# Patient Record
Sex: FEMALE | Race: BLACK OR AFRICAN AMERICAN | NOT HISPANIC OR LATINO | Employment: UNEMPLOYED | URBAN - METROPOLITAN AREA
[De-identification: names, ages, dates, MRNs, and addresses within clinical notes are randomized per-mention and may not be internally consistent; named-entity substitution may affect disease eponyms.]

---

## 2023-01-31 RX ORDER — ACETAMINOPHEN 500 MG
2 TABLET ORAL AS NEEDED
COMMUNITY

## 2023-01-31 RX ORDER — METFORMIN HYDROCHLORIDE 500 MG/1
1 TABLET ORAL
COMMUNITY
Start: 2022-08-04 | End: 2023-10-09 | Stop reason: SDUPTHER

## 2023-01-31 RX ORDER — LOSARTAN POTASSIUM 100 MG/1
1 TABLET ORAL
COMMUNITY
Start: 2022-04-04 | End: 2024-01-09 | Stop reason: SDUPTHER

## 2023-01-31 RX ORDER — TRIAMCINOLONE ACETONIDE 1 MG/G
OINTMENT TOPICAL
COMMUNITY
Start: 2022-04-04 | End: 2024-05-14 | Stop reason: SDUPTHER

## 2023-01-31 RX ORDER — DOXYCYCLINE 100 MG/1
1 CAPSULE ORAL EVERY 12 HOURS
COMMUNITY
End: 2023-03-14 | Stop reason: ALTCHOICE

## 2023-01-31 RX ORDER — ALPRAZOLAM 1 MG/1
1 TABLET ORAL
COMMUNITY
End: 2023-03-14 | Stop reason: ALTCHOICE

## 2023-01-31 RX ORDER — HYDROXYZINE HYDROCHLORIDE 25 MG/1
1 TABLET, FILM COATED ORAL 3 TIMES DAILY PRN
COMMUNITY
End: 2023-08-08 | Stop reason: SDUPTHER

## 2023-01-31 RX ORDER — BUDESONIDE, GLYCOPYRROLATE, AND FORMOTEROL FUMARATE 160; 9; 4.8 UG/1; UG/1; UG/1
2 AEROSOL, METERED RESPIRATORY (INHALATION) 2 TIMES DAILY
COMMUNITY
Start: 2022-05-23 | End: 2023-10-16 | Stop reason: SDUPTHER

## 2023-01-31 RX ORDER — ALBUTEROL SULFATE 90 UG/1
1-2 AEROSOL, METERED RESPIRATORY (INHALATION)
COMMUNITY
End: 2023-10-25 | Stop reason: SDUPTHER

## 2023-01-31 RX ORDER — ROSUVASTATIN CALCIUM 40 MG/1
1 TABLET, COATED ORAL
COMMUNITY
Start: 2022-04-04 | End: 2023-09-27 | Stop reason: SDUPTHER

## 2023-03-14 ENCOUNTER — OFFICE VISIT (OUTPATIENT)
Dept: PRIMARY CARE | Facility: CLINIC | Age: 68
End: 2023-03-14
Payer: MEDICARE

## 2023-03-14 VITALS — HEART RATE: 92 BPM | SYSTOLIC BLOOD PRESSURE: 120 MMHG | DIASTOLIC BLOOD PRESSURE: 70 MMHG | OXYGEN SATURATION: 97 %

## 2023-03-14 DIAGNOSIS — Z78.0 POSTMENOPAUSAL STATE: ICD-10-CM

## 2023-03-14 DIAGNOSIS — Z12.31 ENCOUNTER FOR SCREENING MAMMOGRAM FOR BREAST CANCER: ICD-10-CM

## 2023-03-14 DIAGNOSIS — Z13.220 SCREENING FOR LIPID DISORDERS: ICD-10-CM

## 2023-03-14 DIAGNOSIS — R79.9 ABNORMAL BLOOD CHEMISTRY: ICD-10-CM

## 2023-03-14 DIAGNOSIS — E11.9 CONTROLLED TYPE 2 DIABETES MELLITUS WITHOUT COMPLICATION, WITHOUT LONG-TERM CURRENT USE OF INSULIN (MULTI): Primary | ICD-10-CM

## 2023-03-14 DIAGNOSIS — J44.9 CHRONIC OBSTRUCTIVE PULMONARY DISEASE, UNSPECIFIED COPD TYPE (MULTI): ICD-10-CM

## 2023-03-14 DIAGNOSIS — C49.9 SPINDLE CELL SARCOMA (MULTI): ICD-10-CM

## 2023-03-14 DIAGNOSIS — R53.83 FATIGUE, UNSPECIFIED TYPE: ICD-10-CM

## 2023-03-14 DIAGNOSIS — G47.33 OBSTRUCTIVE SLEEP APNEA: ICD-10-CM

## 2023-03-14 PROBLEM — J30.9 ALLERGIC RHINITIS: Status: ACTIVE | Noted: 2023-03-14

## 2023-03-14 PROBLEM — F17.200 NICOTINE DEPENDENCE: Status: ACTIVE | Noted: 2023-03-14

## 2023-03-14 PROBLEM — M54.9 CHRONIC BACK PAIN: Status: ACTIVE | Noted: 2023-03-14

## 2023-03-14 PROBLEM — E78.5 HYPERLIPIDEMIA: Status: ACTIVE | Noted: 2023-03-14

## 2023-03-14 PROBLEM — H40.9 GLAUCOMA: Status: ACTIVE | Noted: 2023-03-14

## 2023-03-14 PROBLEM — G89.29 CHRONIC BACK PAIN: Status: ACTIVE | Noted: 2023-03-14

## 2023-03-14 PROBLEM — I10 HYPERTENSION: Status: ACTIVE | Noted: 2023-03-14

## 2023-03-14 PROBLEM — J45.909 ASTHMA (HHS-HCC): Status: ACTIVE | Noted: 2023-03-14

## 2023-03-14 PROCEDURE — 3074F SYST BP LT 130 MM HG: CPT | Performed by: PHYSICIAN ASSISTANT

## 2023-03-14 PROCEDURE — 99214 OFFICE O/P EST MOD 30 MIN: CPT | Performed by: PHYSICIAN ASSISTANT

## 2023-03-14 PROCEDURE — 4010F ACE/ARB THERAPY RXD/TAKEN: CPT | Performed by: PHYSICIAN ASSISTANT

## 2023-03-14 PROCEDURE — 1160F RVW MEDS BY RX/DR IN RCRD: CPT | Performed by: PHYSICIAN ASSISTANT

## 2023-03-14 PROCEDURE — 1159F MED LIST DOCD IN RCRD: CPT | Performed by: PHYSICIAN ASSISTANT

## 2023-03-14 PROCEDURE — 3078F DIAST BP <80 MM HG: CPT | Performed by: PHYSICIAN ASSISTANT

## 2023-03-14 ASSESSMENT — PATIENT HEALTH QUESTIONNAIRE - PHQ9
1. LITTLE INTEREST OR PLEASURE IN DOING THINGS: NOT AT ALL
2. FEELING DOWN, DEPRESSED OR HOPELESS: NOT AT ALL
SUM OF ALL RESPONSES TO PHQ9 QUESTIONS 1 AND 2: 0

## 2023-03-14 NOTE — PROGRESS NOTES
Subjective   Patient ID: Cathy Curiel is a 67 y.o. female who presents for Follow-up.    HPI 68 yo female, previous Dr Castillo patient, presents for follow up. Generally doing well, no complaints.    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 Follows with orthopedic surgery.    HTN: Managed with Losartan 100mg. Tolerating well. Does take BP at home. 128-138/76-84. Denies symptoms dizziness, CP, SOB/VIRK, leg swelling.     ERA: Non-compliant with CPAP d/t improper fit and titration. She is planning on scheduling with sleep medicine for further management. Notes having headaches every morning upon waking up, about every other week.Also notes excessive daytime sleepiness.     T2DM: Managed with Metformin 500mg. She is checking BG levels at home, running lowest 76, highest 189. Denies polyuria, polydipsia, fatigue, lightheadedness. Tries to follow a low carb diet. Last A1C 6.2%. No previous urine albumin in EMR, will update. Needs to schedule for diabetic foot and eye exams.     COPD/asthma: Managed with Breztri 2x daily and albuterol PRN. Follows with pulmonology, last seen 1 month ago. Rinses mouth with Breztri use. Denies coughing, wheezing, SOB.    R ear: C/o of high pitched ringing every few months. Does have a hx of allergic rhinitis. No pain. States she has improvement of symptoms with Flonase.    Health Maintenance:  Immunizations:   -Flu: UTD  -COVID 19: received 3 doses   -Shingrix: Recommended, obtain from pharmacy  -Pneumococcal: will obtain [id number] at a later date (nurse schedule)  Mammogram (>41yo): DUE ([facility]) - ordered   PAP - not indicated d/t age  Colonoscopy (45-74yo): UTD, due in 2024  DEXA bone density screening (women >66yo): DUE - ordered   CT cardiac scoring (40-69yo): UTD (last 2/28/2023)  CT chest low dose screening for lung cancer (50-76yo with 20pyh, who currently smoke or who have quit in the last 15yrs): UTD (last 8/4/22) - per  "Pulmonologist     Objective   /70   Pulse 92   Ht (P) 1.626 m (5' 4\")   Wt (P) 86.5 kg (190 lb 12.8 oz)   SpO2 97%   BMI (P) 32.75 kg/m²     Physical Exam  Vitals reviewed.   Constitutional:       General: She is not in acute distress.     Appearance: Normal appearance. She is not ill-appearing.   HENT:      Head: Normocephalic and atraumatic.      Right Ear: Tympanic membrane, ear canal and external ear normal.      Left Ear: Tympanic membrane, ear canal and external ear normal.   Eyes:      General: No scleral icterus.     Extraocular Movements: Extraocular movements intact.      Conjunctiva/sclera: Conjunctivae normal.      Pupils: Pupils are equal, round, and reactive to light.   Cardiovascular:      Rate and Rhythm: Normal rate and regular rhythm.      Heart sounds: Normal heart sounds. No murmur heard.     No friction rub. No gallop.   Pulmonary:      Effort: Pulmonary effort is normal. No respiratory distress.      Breath sounds: Normal breath sounds. No stridor. No wheezing, rhonchi or rales.   Musculoskeletal:      Cervical back: Normal range of motion.      Right lower leg: No edema.      Left lower leg: No edema.   Skin:     General: Skin is warm and dry.   Neurological:      Mental Status: She is alert and oriented to person, place, and time. Mental status is at baseline.      Cranial Nerves: No cranial nerve deficit.      Gait: Gait normal.   Psychiatric:         Mood and Affect: Mood normal.         Behavior: Behavior normal.       Assessment/Plan   Problem List Items Addressed This Visit       Spindle cell sarcoma (CMS/HCC)     S/p wide excision of a left arm 10/10/22. Follows with orthopedic surgery.          Chronic obstructive pulmonary disease, unspecified COPD type (CMS/HCC)     Stable on Breztri and albuterol.  Follows with pulmonology, last seen 1 month ago.         Controlled type 2 diabetes mellitus without complication, without long-term current use of insulin (CMS/HCC) - Primary "     Stable on metformin 500 mg.  Hemoglobin A1c and urine albumin ordered.  Encouraged to schedule for diabetic foot and eye exams.  Continue checking blood glucose levels 1-2 times daily.         Relevant Orders    Albumin, urine, random    Obstructive sleep apnea     Schedule with sleep medicine for PAP titration.  Encouraged weight loss.  Likely the cause of headaches as she is currently not using PAP.          Other Visit Diagnoses       Encounter for screening mammogram for breast cancer        Relevant Orders    BI mammo bilateral screening tomosynthesis    Postmenopausal state        Relevant Orders    XR DEXA bone density    Fatigue, unspecified type        Relevant Orders    Tsh With Reflex To Free T4 If Abnormal    CBC and Auto Differential    Abnormal blood chemistry        Relevant Orders    Comprehensive metabolic panel    Hemoglobin A1c    Screening for lipid disorders        Relevant Orders    Lipid panel

## 2023-03-14 NOTE — ASSESSMENT & PLAN NOTE
Schedule with sleep medicine for PAP titration.  Encouraged weight loss.  Likely the cause of headaches as she is currently not using PAP.

## 2023-03-14 NOTE — ASSESSMENT & PLAN NOTE
Stable on metformin 500 mg.  Hemoglobin A1c and urine albumin ordered.  Encouraged to schedule for diabetic foot and eye exams.  Continue checking blood glucose levels 1-2 times daily.

## 2023-04-25 DIAGNOSIS — E11.9 CONTROLLED TYPE 2 DIABETES MELLITUS WITHOUT COMPLICATION, WITHOUT LONG-TERM CURRENT USE OF INSULIN (MULTI): ICD-10-CM

## 2023-04-25 RX ORDER — INSULIN PUMP SYRINGE, 3 ML
1 EACH MISCELLANEOUS DAILY
Qty: 1 EACH | Refills: 0 | Status: SHIPPED | OUTPATIENT
Start: 2023-04-25

## 2023-04-25 RX ORDER — BLOOD SUGAR DIAGNOSTIC
1 STRIP MISCELLANEOUS DAILY
Qty: 30 STRIP | Refills: 3 | Status: SHIPPED | OUTPATIENT
Start: 2023-04-25 | End: 2023-10-03

## 2023-04-25 RX ORDER — BLOOD-GLUCOSE CONTROL, NORMAL
1 EACH MISCELLANEOUS DAILY
Qty: 100 EACH | Refills: 3 | Status: SHIPPED | OUTPATIENT
Start: 2023-04-25 | End: 2024-05-14 | Stop reason: SDUPTHER

## 2023-04-25 RX ORDER — ISOPROPYL ALCOHOL 70 ML/100ML
1 SWAB TOPICAL DAILY
Qty: 100 EACH | Refills: 2 | Status: SHIPPED | OUTPATIENT
Start: 2023-04-25 | End: 2024-05-14 | Stop reason: SDUPTHER

## 2023-04-25 RX ORDER — INSULIN PUMP SYRINGE, 3 ML
1 EACH MISCELLANEOUS DAILY
COMMUNITY
End: 2023-04-25 | Stop reason: SDUPTHER

## 2023-04-25 RX ORDER — BLOOD SUGAR DIAGNOSTIC
1 STRIP MISCELLANEOUS DAILY
COMMUNITY
End: 2023-04-25 | Stop reason: SDUPTHER

## 2023-04-25 RX ORDER — BLOOD-GLUCOSE CONTROL, NORMAL
EACH MISCELLANEOUS
COMMUNITY
End: 2023-04-25 | Stop reason: SDUPTHER

## 2023-04-25 RX ORDER — ISOPROPYL ALCOHOL 70 ML/100ML
SWAB TOPICAL
COMMUNITY
End: 2023-04-25 | Stop reason: SDUPTHER

## 2023-08-08 ENCOUNTER — OFFICE VISIT (OUTPATIENT)
Dept: PRIMARY CARE | Facility: CLINIC | Age: 68
End: 2023-08-08
Payer: COMMERCIAL

## 2023-08-08 ENCOUNTER — LAB (OUTPATIENT)
Dept: LAB | Facility: LAB | Age: 68
End: 2023-08-08
Payer: COMMERCIAL

## 2023-08-08 VITALS
OXYGEN SATURATION: 96 % | BODY MASS INDEX: 33.29 KG/M2 | DIASTOLIC BLOOD PRESSURE: 84 MMHG | HEIGHT: 64 IN | HEART RATE: 94 BPM | WEIGHT: 195 LBS | SYSTOLIC BLOOD PRESSURE: 138 MMHG

## 2023-08-08 DIAGNOSIS — J06.9 UPPER RESPIRATORY TRACT INFECTION, UNSPECIFIED TYPE: ICD-10-CM

## 2023-08-08 DIAGNOSIS — Z13.89 ENCOUNTER FOR SCREENING FOR OTHER DISORDER: ICD-10-CM

## 2023-08-08 DIAGNOSIS — R53.83 FATIGUE, UNSPECIFIED TYPE: ICD-10-CM

## 2023-08-08 DIAGNOSIS — E78.5 HYPERLIPIDEMIA, UNSPECIFIED HYPERLIPIDEMIA TYPE: ICD-10-CM

## 2023-08-08 DIAGNOSIS — Z00.00 MEDICARE ANNUAL WELLNESS VISIT, SUBSEQUENT: Primary | ICD-10-CM

## 2023-08-08 DIAGNOSIS — F17.210 CIGARETTE NICOTINE DEPENDENCE WITHOUT COMPLICATION: ICD-10-CM

## 2023-08-08 DIAGNOSIS — L29.9 ITCHING: ICD-10-CM

## 2023-08-08 DIAGNOSIS — C49.9 SPINDLE CELL SARCOMA (MULTI): ICD-10-CM

## 2023-08-08 DIAGNOSIS — E11.9 CONTROLLED TYPE 2 DIABETES MELLITUS WITHOUT COMPLICATION, WITHOUT LONG-TERM CURRENT USE OF INSULIN (MULTI): ICD-10-CM

## 2023-08-08 DIAGNOSIS — Z13.220 SCREENING FOR LIPID DISORDERS: ICD-10-CM

## 2023-08-08 DIAGNOSIS — R79.9 ABNORMAL BLOOD CHEMISTRY: ICD-10-CM

## 2023-08-08 DIAGNOSIS — Z00.00 ROUTINE GENERAL MEDICAL EXAMINATION AT HEALTH CARE FACILITY: ICD-10-CM

## 2023-08-08 DIAGNOSIS — J44.9 CHRONIC OBSTRUCTIVE PULMONARY DISEASE, UNSPECIFIED COPD TYPE (MULTI): ICD-10-CM

## 2023-08-08 PROBLEM — B35.4 TINEA CORPORIS: Status: ACTIVE | Noted: 2023-08-08

## 2023-08-08 PROBLEM — K31.819 AVM (ARTERIOVENOUS MALFORMATION) OF DUODENUM, ACQUIRED: Status: ACTIVE | Noted: 2020-01-23

## 2023-08-08 PROBLEM — L30.9 ERYTHEMATOUS ECZEMA: Status: ACTIVE | Noted: 2023-08-08

## 2023-08-08 PROBLEM — M25.562 CHRONIC PAIN OF LEFT KNEE: Status: ACTIVE | Noted: 2018-06-19

## 2023-08-08 PROBLEM — D64.9 ANEMIA: Status: ACTIVE | Noted: 2020-01-23

## 2023-08-08 PROBLEM — M79.89 MASS OF SOFT TISSUE OF UPPER ARM: Status: ACTIVE | Noted: 2023-08-08

## 2023-08-08 PROBLEM — K55.20: Status: ACTIVE | Noted: 2020-10-29

## 2023-08-08 PROBLEM — K59.00 CONSTIPATION: Status: ACTIVE | Noted: 2020-10-29

## 2023-08-08 PROBLEM — J01.90 ACUTE SINUSITIS: Status: ACTIVE | Noted: 2023-08-08

## 2023-08-08 PROBLEM — D50.9 MICROCYTIC ANEMIA: Status: ACTIVE | Noted: 2020-01-08

## 2023-08-08 PROBLEM — M17.12 PRIMARY OSTEOARTHRITIS OF LEFT KNEE: Status: ACTIVE | Noted: 2018-08-22

## 2023-08-08 PROBLEM — R14.0 ABDOMINAL BLOATING: Status: ACTIVE | Noted: 2020-10-29

## 2023-08-08 PROBLEM — R10.10 PAIN OF UPPER ABDOMEN: Status: ACTIVE | Noted: 2020-11-09

## 2023-08-08 PROBLEM — G89.29 CHRONIC PAIN OF LEFT KNEE: Status: ACTIVE | Noted: 2018-06-19

## 2023-08-08 PROBLEM — R22.32 SUBCUTANEOUS MASS OF LEFT UPPER EXTREMITY: Status: ACTIVE | Noted: 2023-08-08

## 2023-08-08 PROBLEM — D50.9 IRON DEFICIENCY ANEMIA: Status: ACTIVE | Noted: 2017-05-10

## 2023-08-08 LAB
ALANINE AMINOTRANSFERASE (SGPT) (U/L) IN SER/PLAS: 15 U/L (ref 7–45)
ALBUMIN (G/DL) IN SER/PLAS: 4.3 G/DL (ref 3.4–5)
ALBUMIN (MG/L) IN URINE: 25.9 MG/L
ALBUMIN/CREATININE (UG/MG) IN URINE: 15.4 UG/MG CRT (ref 0–30)
ALKALINE PHOSPHATASE (U/L) IN SER/PLAS: 63 U/L (ref 33–136)
ANION GAP IN SER/PLAS: 11 MMOL/L (ref 10–20)
ASPARTATE AMINOTRANSFERASE (SGOT) (U/L) IN SER/PLAS: 15 U/L (ref 9–39)
BASOPHILS (10*3/UL) IN BLOOD BY AUTOMATED COUNT: 0.07 X10E9/L (ref 0–0.1)
BASOPHILS/100 LEUKOCYTES IN BLOOD BY AUTOMATED COUNT: 0.6 % (ref 0–2)
BILIRUBIN TOTAL (MG/DL) IN SER/PLAS: 0.3 MG/DL (ref 0–1.2)
CALCIUM (MG/DL) IN SER/PLAS: 10.2 MG/DL (ref 8.6–10.6)
CARBON DIOXIDE, TOTAL (MMOL/L) IN SER/PLAS: 27 MMOL/L (ref 21–32)
CHLORIDE (MMOL/L) IN SER/PLAS: 108 MMOL/L (ref 98–107)
CHOLESTEROL (MG/DL) IN SER/PLAS: 108 MG/DL (ref 0–199)
CHOLESTEROL IN HDL (MG/DL) IN SER/PLAS: 56.3 MG/DL
CHOLESTEROL/HDL RATIO: 1.9
CREATININE (MG/DL) IN SER/PLAS: 1.01 MG/DL (ref 0.5–1.05)
CREATININE (MG/DL) IN URINE: 168 MG/DL (ref 20–320)
EOSINOPHILS (10*3/UL) IN BLOOD BY AUTOMATED COUNT: 0.13 X10E9/L (ref 0–0.7)
EOSINOPHILS/100 LEUKOCYTES IN BLOOD BY AUTOMATED COUNT: 1.2 % (ref 0–6)
ERYTHROCYTE DISTRIBUTION WIDTH (RATIO) BY AUTOMATED COUNT: 15.8 % (ref 11.5–14.5)
ERYTHROCYTE MEAN CORPUSCULAR HEMOGLOBIN CONCENTRATION (G/DL) BY AUTOMATED: 32.5 G/DL (ref 32–36)
ERYTHROCYTE MEAN CORPUSCULAR VOLUME (FL) BY AUTOMATED COUNT: 91 FL (ref 80–100)
ERYTHROCYTES (10*6/UL) IN BLOOD BY AUTOMATED COUNT: 4.68 X10E12/L (ref 4–5.2)
ESTIMATED AVERAGE GLUCOSE FOR HBA1C: 128 MG/DL
GFR FEMALE: 61 ML/MIN/1.73M2
GLUCOSE (MG/DL) IN SER/PLAS: 111 MG/DL (ref 74–99)
HEMATOCRIT (%) IN BLOOD BY AUTOMATED COUNT: 42.5 % (ref 36–46)
HEMOGLOBIN (G/DL) IN BLOOD: 13.8 G/DL (ref 12–16)
HEMOGLOBIN A1C/HEMOGLOBIN TOTAL IN BLOOD: 6.1 %
IMMATURE GRANULOCYTES/100 LEUKOCYTES IN BLOOD BY AUTOMATED COUNT: 0.5 % (ref 0–0.9)
LDL: 35 MG/DL (ref 0–99)
LEUKOCYTES (10*3/UL) IN BLOOD BY AUTOMATED COUNT: 11 X10E9/L (ref 4.4–11.3)
LYMPHOCYTES (10*3/UL) IN BLOOD BY AUTOMATED COUNT: 2.46 X10E9/L (ref 1.2–4.8)
LYMPHOCYTES/100 LEUKOCYTES IN BLOOD BY AUTOMATED COUNT: 22.3 % (ref 13–44)
MONOCYTES (10*3/UL) IN BLOOD BY AUTOMATED COUNT: 0.81 X10E9/L (ref 0.1–1)
MONOCYTES/100 LEUKOCYTES IN BLOOD BY AUTOMATED COUNT: 7.3 % (ref 2–10)
NEUTROPHILS (10*3/UL) IN BLOOD BY AUTOMATED COUNT: 7.51 X10E9/L (ref 1.2–7.7)
NEUTROPHILS/100 LEUKOCYTES IN BLOOD BY AUTOMATED COUNT: 68.1 % (ref 40–80)
NRBC (PER 100 WBCS) BY AUTOMATED COUNT: 0 /100 WBC (ref 0–0)
PLATELETS (10*3/UL) IN BLOOD AUTOMATED COUNT: 325 X10E9/L (ref 150–450)
POTASSIUM (MMOL/L) IN SER/PLAS: 4.4 MMOL/L (ref 3.5–5.3)
PROTEIN TOTAL: 7.3 G/DL (ref 6.4–8.2)
SODIUM (MMOL/L) IN SER/PLAS: 142 MMOL/L (ref 136–145)
THYROTROPIN (MIU/L) IN SER/PLAS BY DETECTION LIMIT <= 0.05 MIU/L: 1.16 MIU/L (ref 0.44–3.98)
TRIGLYCERIDE (MG/DL) IN SER/PLAS: 84 MG/DL (ref 0–149)
UREA NITROGEN (MG/DL) IN SER/PLAS: 19 MG/DL (ref 6–23)
VLDL: 17 MG/DL (ref 0–40)

## 2023-08-08 PROCEDURE — 36415 COLL VENOUS BLD VENIPUNCTURE: CPT

## 2023-08-08 PROCEDURE — 99397 PER PM REEVAL EST PAT 65+ YR: CPT | Performed by: PHYSICIAN ASSISTANT

## 2023-08-08 PROCEDURE — G0444 DEPRESSION SCREEN ANNUAL: HCPCS | Performed by: PHYSICIAN ASSISTANT

## 2023-08-08 PROCEDURE — G0439 PPPS, SUBSEQ VISIT: HCPCS | Performed by: PHYSICIAN ASSISTANT

## 2023-08-08 PROCEDURE — 1170F FXNL STATUS ASSESSED: CPT | Performed by: PHYSICIAN ASSISTANT

## 2023-08-08 PROCEDURE — 80053 COMPREHEN METABOLIC PANEL: CPT

## 2023-08-08 PROCEDURE — 85025 COMPLETE CBC W/AUTO DIFF WBC: CPT

## 2023-08-08 PROCEDURE — 3044F HG A1C LEVEL LT 7.0%: CPT | Performed by: PHYSICIAN ASSISTANT

## 2023-08-08 PROCEDURE — 1125F AMNT PAIN NOTED PAIN PRSNT: CPT | Performed by: PHYSICIAN ASSISTANT

## 2023-08-08 PROCEDURE — 3079F DIAST BP 80-89 MM HG: CPT | Performed by: PHYSICIAN ASSISTANT

## 2023-08-08 PROCEDURE — 99214 OFFICE O/P EST MOD 30 MIN: CPT | Performed by: PHYSICIAN ASSISTANT

## 2023-08-08 PROCEDURE — 99406 BEHAV CHNG SMOKING 3-10 MIN: CPT | Performed by: PHYSICIAN ASSISTANT

## 2023-08-08 PROCEDURE — 1159F MED LIST DOCD IN RCRD: CPT | Performed by: PHYSICIAN ASSISTANT

## 2023-08-08 PROCEDURE — 4010F ACE/ARB THERAPY RXD/TAKEN: CPT | Performed by: PHYSICIAN ASSISTANT

## 2023-08-08 PROCEDURE — 84443 ASSAY THYROID STIM HORMONE: CPT

## 2023-08-08 PROCEDURE — 80061 LIPID PANEL: CPT

## 2023-08-08 PROCEDURE — 82043 UR ALBUMIN QUANTITATIVE: CPT

## 2023-08-08 PROCEDURE — 3075F SYST BP GE 130 - 139MM HG: CPT | Performed by: PHYSICIAN ASSISTANT

## 2023-08-08 PROCEDURE — 82570 ASSAY OF URINE CREATININE: CPT

## 2023-08-08 PROCEDURE — 1124F ACP DISCUSS-NO DSCNMKR DOCD: CPT | Performed by: PHYSICIAN ASSISTANT

## 2023-08-08 PROCEDURE — 1160F RVW MEDS BY RX/DR IN RCRD: CPT | Performed by: PHYSICIAN ASSISTANT

## 2023-08-08 PROCEDURE — 83036 HEMOGLOBIN GLYCOSYLATED A1C: CPT

## 2023-08-08 RX ORDER — DOXYCYCLINE 100 MG/1
100 CAPSULE ORAL 2 TIMES DAILY
Qty: 14 CAPSULE | Refills: 0 | Status: SHIPPED | OUTPATIENT
Start: 2023-08-08 | End: 2023-08-21 | Stop reason: ALTCHOICE

## 2023-08-08 RX ORDER — HYDROXYZINE HYDROCHLORIDE 25 MG/1
25 TABLET, FILM COATED ORAL 3 TIMES DAILY PRN
Qty: 90 TABLET | Refills: 1 | Status: SHIPPED | OUTPATIENT
Start: 2023-08-08 | End: 2023-08-08 | Stop reason: SDUPTHER

## 2023-08-08 RX ORDER — FLUTICASONE PROPIONATE 50 MCG
1 SPRAY, SUSPENSION (ML) NASAL DAILY
Qty: 16 G | Refills: 5 | Status: SHIPPED | OUTPATIENT
Start: 2023-08-08 | End: 2023-08-08 | Stop reason: SDUPTHER

## 2023-08-08 RX ORDER — FLUTICASONE PROPIONATE 50 MCG
1 SPRAY, SUSPENSION (ML) NASAL DAILY
Qty: 16 G | Refills: 5 | Status: SHIPPED | OUTPATIENT
Start: 2023-08-08 | End: 2023-10-25 | Stop reason: SDUPTHER

## 2023-08-08 RX ORDER — HYDROXYZINE HYDROCHLORIDE 25 MG/1
25 TABLET, FILM COATED ORAL 3 TIMES DAILY PRN
Qty: 270 TABLET | Refills: 0 | Status: SHIPPED | OUTPATIENT
Start: 2023-08-08

## 2023-08-08 ASSESSMENT — ACTIVITIES OF DAILY LIVING (ADL)
TAKING_MEDICATION: INDEPENDENT
BATHING: INDEPENDENT
DOING_HOUSEWORK: INDEPENDENT
GROCERY_SHOPPING: INDEPENDENT
DRESSING: INDEPENDENT
MANAGING_FINANCES: INDEPENDENT

## 2023-08-08 ASSESSMENT — ENCOUNTER SYMPTOMS
LOSS OF SENSATION IN FEET: 0
DEPRESSION: 0
OCCASIONAL FEELINGS OF UNSTEADINESS: 0

## 2023-08-08 ASSESSMENT — PATIENT HEALTH QUESTIONNAIRE - PHQ9
SUM OF ALL RESPONSES TO PHQ9 QUESTIONS 1 AND 2: 0
1. LITTLE INTEREST OR PLEASURE IN DOING THINGS: NOT AT ALL
2. FEELING DOWN, DEPRESSED OR HOPELESS: NOT AT ALL

## 2023-08-08 NOTE — PROGRESS NOTES
Subjective   Reason for Visit: Cathy Curiel is an 68 y.o. female here for a Medicare Wellness visit.     Past Medical, Surgical, and Family History reviewed and updated in chart.    Reviewed all medications by prescribing practitioner or clinical pharmacist (such as prescriptions, OTCs, herbal therapies and supplements) and documented in the medical record.    HPI 67yo female presenting for [facility] wellness exam. Overall doing okay. She is c/o:    URI symptoms: for the past 2-3 weeks has had symptoms of chills, joint aches, b/l ear pressure, sinus and eye pressure, cough with yellow sputum, poor sleep d/t coughing, and wheezing when breathing. No fevers, sick contacts, sore throat, sneezing, changes in vision or hearing, tinnitus, chest tightness, SOB/VIRK. She has tried coricidin with mild relief of symptoms.     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 Follows with orthopedic surgery, scheduled at the end of the month. She notes she is still having a lot of pain, not relieved by tylenol. Scheduled for CT and MRI w/ contrast. States she is nervous for MRI d/t previously having a reaction with contrast.      HTN, HLD: Stable on current regimen. Does check BP at home, runs 128-138/76-84. Denies symptoms dizziness, CP, SOB/VIRK, leg swelling. Cannot take  baby ASA d/t prior lower GI bleed.      ERA: Non-compliant with CPAP d/t improper fit and titration. She is planning on scheduling with sleep medicine for further management. Notes having headaches every morning upon waking up, about every other week. Also notes excessive daytime sleepiness.      T2DM: Managed with Metformin 500mg. She is checking BG levels at home, running lowest 77, highest 180, usually 100-110 fasting. Tries to follow a low carb diet. Last A1C 6.2%. No previous urine albumin in EMR, will update. Needs to schedule for diabetic foot and eye exams.      COPD/asthma, nicotine use: Managed with Breztri 2x daily and  "albuterol PRN. Follows with pulmonology, last seen 1 month ago. Rinses mouth with Breztri use. Denies coughing, wheezing, SOB. CT chest low dose screening due. Smoking daily.      Health Maintenance:  Immunizations:   -Flu: deferred to fall 2023  -Shingrix: Recommended, obtain from pharmacy  -Pneumococcal: deferred   Mammogram - scheduled  PAP - not indicated d/t age  Colonoscopy: UTD, due in 2024  DEXA bone density screening: DUE - ordered   CT cardiac scoring UTD (last 2/28/2023)  CT chest low dose screening for lung cancer UTD (last 8/4/22) - annually per Pulmonologist    Last MCR / CPE: 8/8/23 (MCR ADV)    Patient Care Team:  Joanna Rowland PA-C as PCP - General (Family Medicine)  Rowan Galo MD as PCP - [facility] [employer] PCP     Objective   Vitals:  /84   Pulse 94   Ht 1.626 m (5' 4\")   Wt 88.5 kg (195 lb)   SpO2 96%   BMI 33.47 kg/m²       Physical Exam  Vitals reviewed.   Constitutional:       General: She is not in acute distress.     Appearance: Normal appearance.   HENT:      Head: Normocephalic and atraumatic.      Right Ear: Tympanic membrane, ear canal and external ear normal. There is no impacted cerumen.      Left Ear: Tympanic membrane, ear canal and external ear normal. There is no impacted cerumen.      Nose: Nose normal. No congestion or rhinorrhea.      Comments: Mild tenderness with palpation to bilateral maxillary sinuses,      Mouth/Throat:      Mouth: Mucous membranes are moist.      Pharynx: Oropharynx is clear. No oropharyngeal exudate or posterior oropharyngeal erythema.   Eyes:      General: No scleral icterus.        Right eye: No discharge.         Left eye: No discharge.      Extraocular Movements: Extraocular movements intact.      Conjunctiva/sclera: Conjunctivae normal.      Pupils: Pupils are equal, round, and reactive to light.   Cardiovascular:      Rate and Rhythm: Normal rate and regular rhythm.      Heart sounds: Normal heart sounds. No " murmur heard.     No friction rub. No gallop.   Pulmonary:      Effort: Pulmonary effort is normal. No respiratory distress.      Breath sounds: Normal breath sounds. No stridor. No wheezing, rhonchi or rales.   Abdominal:      General: Bowel sounds are normal. There is no distension.      Palpations: Abdomen is soft. There is no mass.      Tenderness: There is no abdominal tenderness. There is no right CVA tenderness or left CVA tenderness.   Musculoskeletal:         General: Normal range of motion.      Cervical back: Normal range of motion and neck supple.      Right lower leg: No edema.      Left lower leg: No edema.   Skin:     General: Skin is warm and dry.      Findings: No rash.   Neurological:      General: No focal deficit present.      Mental Status: She is alert and oriented to person, place, and time. Mental status is at baseline.      Cranial Nerves: No cranial nerve deficit.      Gait: Gait normal.   Psychiatric:         Mood and Affect: Mood normal.         Behavior: Behavior normal.         Assessment/Plan   Problem List Items Addressed This Visit       Spindle cell sarcoma (CMS/HCC)    Current Assessment & Plan     S/p wide excision of a left arm 10/10/22. Follows with orthopedic surgery. Complete CT and MRI as scheduled.          Chronic obstructive pulmonary disease, unspecified COPD type (CMS/HCC)    Current Assessment & Plan     Stable on Breztri and albuterol.  Follows with pulmonology, last seen 1 month ago.         Controlled type 2 diabetes mellitus without complication, without long-term current use of insulin (CMS/HCC)    Current Assessment & Plan     Stable on metformin 500 mg.  Hemoglobin A1c ordered.  Encouraged to schedule for diabetic foot and eye exams. Follow an ADA diet. Continue checking blood glucose levels 1-2 times daily.         Hyperlipidemia    Current Assessment & Plan     Stable. Continue rosuvastatin 40mg. Follow a mediterranean style diet.          Nicotine dependence     Current Assessment & Plan     Encouraged smoking cessation. Due for CT chest low dose screening for lung CA. Follow with pulmonary medicine.          Upper respiratory tract infection    Current Assessment & Plan     Begin doxycycline 100mg 2x daily x 7 days. Take coricidin for symptom control.          Relevant Medications    doxycycline (Vibramycin) 100 mg capsule    fluticasone (Flonase) 50 mcg/actuation nasal spray    Routine general medical examination at health care facility    Current Assessment & Plan     Discussed age appropriate preventive health measures.          Itching    Current Assessment & Plan     Refilled hydroxyzine to use PRN.         Relevant Medications    hydrOXYzine HCL (Atarax) 25 mg tablet     Other Visit Diagnoses       Medicare annual wellness visit, subsequent    -  Primary    Encounter for screening for other disorder                 Follow up in 4 months or sooner as needed

## 2023-08-09 PROBLEM — L29.9 ITCHING: Status: ACTIVE | Noted: 2023-08-09

## 2023-08-09 PROBLEM — J06.9 UPPER RESPIRATORY TRACT INFECTION: Status: ACTIVE | Noted: 2023-08-09

## 2023-08-09 PROBLEM — Z00.00 ROUTINE GENERAL MEDICAL EXAMINATION AT HEALTH CARE FACILITY: Status: ACTIVE | Noted: 2023-08-09

## 2023-08-09 PROBLEM — E11.9 CONTROLLED DIABETES MELLITUS (MULTI): Status: RESOLVED | Noted: 2023-08-08 | Resolved: 2023-08-09

## 2023-08-09 NOTE — ASSESSMENT & PLAN NOTE
Encouraged smoking cessation. Due for CT chest low dose screening for lung CA. Follow with pulmonary medicine.

## 2023-08-09 NOTE — ASSESSMENT & PLAN NOTE
Stable on metformin 500 mg.  Hemoglobin A1c ordered.  Encouraged to schedule for diabetic foot and eye exams. Follow an ADA diet. Continue checking blood glucose levels 1-2 times daily.

## 2023-08-09 NOTE — ASSESSMENT & PLAN NOTE
S/p wide excision of a left arm 10/10/22. Follows with orthopedic surgery. Complete CT and MRI as scheduled.

## 2023-08-11 NOTE — RESULT ENCOUNTER NOTE
Lab results are back.     Hemoglobin A1c shows good control of diabetes. Continue medications unchanged.     Kidney function decreased slightly from prior draw. Continue strict control of diabetes and blood pressure. If function continues to decrease in the future, I recommend starting a new diabetic medication, farxiga or jardiance, that can help protect the kidneys.

## 2023-08-21 ENCOUNTER — OFFICE VISIT (OUTPATIENT)
Dept: PRIMARY CARE | Facility: CLINIC | Age: 68
End: 2023-08-21
Payer: COMMERCIAL

## 2023-08-21 VITALS
HEART RATE: 82 BPM | SYSTOLIC BLOOD PRESSURE: 123 MMHG | DIASTOLIC BLOOD PRESSURE: 75 MMHG | WEIGHT: 195 LBS | BODY MASS INDEX: 33.47 KG/M2 | OXYGEN SATURATION: 95 %

## 2023-08-21 DIAGNOSIS — J30.2 SEASONAL ALLERGIES: ICD-10-CM

## 2023-08-21 DIAGNOSIS — E11.9 CONTROLLED TYPE 2 DIABETES MELLITUS WITHOUT COMPLICATION, WITHOUT LONG-TERM CURRENT USE OF INSULIN (MULTI): Primary | ICD-10-CM

## 2023-08-21 DIAGNOSIS — M51.37 DEGENERATION OF INTERVERTEBRAL DISC OF LUMBOSACRAL REGION: ICD-10-CM

## 2023-08-21 PROBLEM — L30.9 ECZEMA: Status: RESOLVED | Noted: 2017-05-09 | Resolved: 2023-08-21

## 2023-08-21 PROCEDURE — 3078F DIAST BP <80 MM HG: CPT | Performed by: PHYSICIAN ASSISTANT

## 2023-08-21 PROCEDURE — 1125F AMNT PAIN NOTED PAIN PRSNT: CPT | Performed by: PHYSICIAN ASSISTANT

## 2023-08-21 PROCEDURE — 4010F ACE/ARB THERAPY RXD/TAKEN: CPT | Performed by: PHYSICIAN ASSISTANT

## 2023-08-21 PROCEDURE — 3074F SYST BP LT 130 MM HG: CPT | Performed by: PHYSICIAN ASSISTANT

## 2023-08-21 PROCEDURE — 99214 OFFICE O/P EST MOD 30 MIN: CPT | Performed by: PHYSICIAN ASSISTANT

## 2023-08-21 PROCEDURE — 1160F RVW MEDS BY RX/DR IN RCRD: CPT | Performed by: PHYSICIAN ASSISTANT

## 2023-08-21 PROCEDURE — 3044F HG A1C LEVEL LT 7.0%: CPT | Performed by: PHYSICIAN ASSISTANT

## 2023-08-21 PROCEDURE — 1159F MED LIST DOCD IN RCRD: CPT | Performed by: PHYSICIAN ASSISTANT

## 2023-08-21 RX ORDER — AZELASTINE 1 MG/ML
1 SPRAY, METERED NASAL 2 TIMES DAILY
Qty: 30 ML | Refills: 3 | Status: SHIPPED | OUTPATIENT
Start: 2023-08-21 | End: 2023-10-25 | Stop reason: ALTCHOICE

## 2023-08-21 NOTE — PROGRESS NOTES
Subjective   Cathy Curiel is a 68 y.o. female who presents for lower back pain, lightheaded, wants off metformin .  HPI Cathy Curiel is a 68 y.o. female presenting for a follow up. Generally doing well. Currently c/o:    Low back pain: chronic. S/p R L3-L5 medial branch nerve radiofrequency ablation under fluoroscopy 11/30/21 and bilateral L3 and L4 medical branch nerve blocks and bilateral L5 posterior rami nerve block under fluroscropy 5/26/21 [facility]. She is requesting Butrans pain patches from me, advised her that we do not prescribe controlled substances. Previously also tried cyclobenzaprine and robaxin. Not currently following with pain management. Requesting handicap placard for difficulty ambulating d/t back pain.     Lightheaded after taking all medications, especially metformin. States she has GI upset with Metformin, has done research on it, and no longer will take it. She is checking BG levels at home, running lowest 77, highest 180, usually 100-110 fasting. Tries to follow a low carb diet. Last A1C 6.1% 8/8/23. Recall she recently was treated for URI, s/p ATBs. Drinks plenty of water. Eats a well rounded diet, avoids fried foods, only bakes foods.    HTN, HLD: Stable on current regimen. Does check BP at home, runs 128-138/76-84.  Denies symptoms dizziness, CP, SOB/VIRK, leg swelling. Cannot take baby ASA d/t prior lower GI bleed.      12 point ROS reviewed and negative other than as stated in HPI    /75   Pulse 82   Wt 88.5 kg (195 lb)   SpO2 95%   BMI 33.47 kg/m²   Objective   Physical Exam  Vitals reviewed.   Constitutional:       General: She is not in acute distress.     Appearance: Normal appearance. She is not ill-appearing.   HENT:      Head: Normocephalic and atraumatic.   Eyes:      General: No scleral icterus.     Extraocular Movements: Extraocular movements intact.      Conjunctiva/sclera: Conjunctivae normal.      Pupils: Pupils are equal, round,  and reactive to light.   Cardiovascular:      Rate and Rhythm: Normal rate and regular rhythm.      Heart sounds: Normal heart sounds. No murmur heard.     No friction rub. No gallop.   Pulmonary:      Effort: Pulmonary effort is normal. No respiratory distress.      Breath sounds: Normal breath sounds. No stridor. No wheezing, rhonchi or rales.   Musculoskeletal:      Cervical back: Normal range of motion.      Right lower leg: No edema.      Left lower leg: No edema.   Skin:     General: Skin is warm and dry.   Neurological:      Mental Status: She is alert and oriented to person, place, and time. Mental status is at baseline.      Cranial Nerves: No cranial nerve deficit.      Gait: Gait normal.   Psychiatric:         Mood and Affect: Mood normal.         Behavior: Behavior normal.       Assessment/Plan   Problem List Items Addressed This Visit       Controlled type 2 diabetes mellitus without complication, without long-term current use of insulin (CMS/[facility]) - Primary     Due to lightheadedness, discontinue metformin. Begin Rybelsus, pending insurance coverage. Will titrate up dose as tolerated. Discussed side effects. Continue checking BG levels closely.          Relevant Medications    semaglutide (Rybelsus) 3 mg tablet    Seasonal allergies     Trial azelastine nasal spray, prescription sent to the pharmacy         Relevant Medications    azelastine (Astelin) 137 mcg (0.1 %) nasal spray    Degeneration of intervertebral disc of lumbosacral region     Chronic. Take tylenol arthritis as needed for pain. Apply heating pad in 20-minute intervals and lidocaine patches in 12-hour intervals.  Can refer to PT or pain management if interested.  Disability placard provided         Relevant Orders    Disability Placard        Follow-up as scheduled or sooner as needed

## 2023-08-26 NOTE — ASSESSMENT & PLAN NOTE
Chronic. Take tylenol arthritis as needed for pain. Apply heating pad in 20-minute intervals and lidocaine patches in 12-hour intervals.  Can refer to PT or pain management if interested.  Disability placard provided

## 2023-09-19 ENCOUNTER — TELEPHONE (OUTPATIENT)
Dept: PRIMARY CARE | Facility: CLINIC | Age: 68
End: 2023-09-19
Payer: COMMERCIAL

## 2023-09-20 ENCOUNTER — APPOINTMENT (OUTPATIENT)
Dept: PRIMARY CARE | Facility: CLINIC | Age: 68
End: 2023-09-20
Payer: COMMERCIAL

## 2023-09-21 ENCOUNTER — APPOINTMENT (OUTPATIENT)
Dept: PRIMARY CARE | Facility: CLINIC | Age: 68
End: 2023-09-21
Payer: COMMERCIAL

## 2023-09-27 DIAGNOSIS — E78.5 HYPERLIPIDEMIA, UNSPECIFIED HYPERLIPIDEMIA TYPE: Primary | ICD-10-CM

## 2023-09-27 RX ORDER — ROSUVASTATIN CALCIUM 40 MG/1
40 TABLET, COATED ORAL
Qty: 90 TABLET | Refills: 1 | Status: SHIPPED | OUTPATIENT
Start: 2023-09-27 | End: 2024-01-09 | Stop reason: SDUPTHER

## 2023-09-29 DIAGNOSIS — E11.9 CONTROLLED TYPE 2 DIABETES MELLITUS WITHOUT COMPLICATION, WITHOUT LONG-TERM CURRENT USE OF INSULIN (MULTI): ICD-10-CM

## 2023-10-03 RX ORDER — BLOOD SUGAR DIAGNOSTIC
STRIP MISCELLANEOUS
Qty: 100 STRIP | Refills: 3 | Status: SHIPPED | OUTPATIENT
Start: 2023-10-03

## 2023-10-09 DIAGNOSIS — E11.9 CONTROLLED TYPE 2 DIABETES MELLITUS WITHOUT COMPLICATION, WITHOUT LONG-TERM CURRENT USE OF INSULIN (MULTI): Primary | ICD-10-CM

## 2023-10-09 RX ORDER — METFORMIN HYDROCHLORIDE 500 MG/1
500 TABLET ORAL
Qty: 90 TABLET | Refills: 1 | Status: SHIPPED | OUTPATIENT
Start: 2023-10-09 | End: 2024-01-09 | Stop reason: SDUPTHER

## 2023-10-10 ENCOUNTER — OFFICE VISIT (OUTPATIENT)
Dept: ORTHOPEDIC SURGERY | Facility: HOSPITAL | Age: 68
End: 2023-10-10
Payer: COMMERCIAL

## 2023-10-10 DIAGNOSIS — C49.9 SPINDLE CELL SARCOMA (MULTI): Primary | ICD-10-CM

## 2023-10-10 PROCEDURE — 1125F AMNT PAIN NOTED PAIN PRSNT: CPT | Performed by: STUDENT IN AN ORGANIZED HEALTH CARE EDUCATION/TRAINING PROGRAM

## 2023-10-10 PROCEDURE — 1159F MED LIST DOCD IN RCRD: CPT | Performed by: STUDENT IN AN ORGANIZED HEALTH CARE EDUCATION/TRAINING PROGRAM

## 2023-10-10 PROCEDURE — 1160F RVW MEDS BY RX/DR IN RCRD: CPT | Performed by: STUDENT IN AN ORGANIZED HEALTH CARE EDUCATION/TRAINING PROGRAM

## 2023-10-10 PROCEDURE — 3044F HG A1C LEVEL LT 7.0%: CPT | Performed by: STUDENT IN AN ORGANIZED HEALTH CARE EDUCATION/TRAINING PROGRAM

## 2023-10-10 PROCEDURE — 99213 OFFICE O/P EST LOW 20 MIN: CPT | Performed by: STUDENT IN AN ORGANIZED HEALTH CARE EDUCATION/TRAINING PROGRAM

## 2023-10-10 PROCEDURE — 4010F ACE/ARB THERAPY RXD/TAKEN: CPT | Performed by: STUDENT IN AN ORGANIZED HEALTH CARE EDUCATION/TRAINING PROGRAM

## 2023-10-10 ASSESSMENT — PAIN - FUNCTIONAL ASSESSMENT: PAIN_FUNCTIONAL_ASSESSMENT: 0-10

## 2023-10-10 ASSESSMENT — PAIN SCALES - GENERAL: PAINLEVEL_OUTOF10: 8

## 2023-10-10 NOTE — PROGRESS NOTES
Orthopaedic Surgery  New Patient Clinic Note    Cathy Curiel [id number] October 10, 2023    Reason for Visit: Follow-up for left arm spindle cell sarcoma    HPI: Patient is a 68-year-old female here for follow-up status post wide resection of a left arm distal sarcoma.  She is here for surveillance scans of her left arm as well as CT scan of her chest.  She denies any new lumps or bumps.  Denies any significant shortness of breath.  No significant numbness or tingling.  No new issues to report.    ROS:    Review of Systems  A complete review of systems was conducted, pertinent only to the HPI noted above.    Constitutional: None    Eyes: No additions to above history    Ears, Nose, Throat: No additions to above history    Cardiovascular: No additions to above history    Respiratory: No additions to above history    GI: No additions to above history    : No additions to above history    Skin/Neuro: No additions to above history    Endocrine/Heme/Lymph: No additions to above history    Immunologic: No additions to above history    Psychiatric: No additions to above history    Musculoskeletal: see above      PMH:   Past Medical History:   Diagnosis Date    Other intervertebral disc displacement, lumbar region     Lumbar disc herniation   No history of DVT.     PSH:    Past Surgical History:   Procedure Laterality Date    CT GUIDED PERCUTANEOUS BIOPSY BONE DEEP  8/29/2022    CT GUIDED PERCUTANEOUS BIOPSY BONE DEEP 8/29/2022 [facility]    OTHER SURGICAL HISTORY  04/04/2022    Shoulder arthroscopy        SHx: Smoking.     Meds:   Current Outpatient Medications on File Prior to Visit   Medication Sig Dispense Refill    acetaminophen (Tylenol) 500 mg tablet Take 2 tablets (1,000 mg) by mouth if needed.      albuterol 90 mcg/actuation inhaler Inhale 1-2 puffs. Every 4 to 6 hours as needed      alcohol swabs (Alcohol Prep Pads) pads, medicated Apply 1 Pad topically once daily. 100 each 2    azelastine (Astelin) 137  mcg (0.1 %) nasal spray Administer 1 spray into each nostril 2 times a day. Use in each nostril as directed 30 mL 3    blood sugar diagnostic (OneTouch Ultra Test) strip TEST ONCE DAILY 100 strip 3    budesonide-glycopyr-formoterol (Breztri Aerosphere) 160-9-4.8 mcg/actuation HFA aerosol inhaler Inhale 2 puffs 2 times a day.      fluticasone (Flonase) 50 mcg/actuation nasal spray Administer 1 spray into each nostril once daily. Shake gently. Before first use, prime pump. After use, clean tip and replace cap. 16 g 5    FreeStyle glucose monitoring (OneTouch Ultra2 Meter) kit 1 each once daily. 1 each 0    hydrOXYzine HCL (Atarax) 25 mg tablet Take 1 tablet (25 mg) by mouth 3 times a day as needed for itching. 270 tablet 0    lancets (mapp2link Delica Safety Lancet) 30 gauge misc 1 Lancet once daily. 100 each 3    losartan (Cozaar) 100 mg tablet Take 1 tablet (100 mg) by mouth once every day.      metFORMIN (Glucophage) 500 mg tablet Take 1 tablet (500 mg) by mouth once every day. 90 tablet 1    mv,rex,min/iron/folic acid/lut (COMPLETE MULTI ORAL) Take 1 capsule by mouth 1 (one) time each day.      rosuvastatin (Crestor) 40 mg tablet Take 1 tablet (40 mg) by mouth once every day. 90 tablet 1    triamcinolone (Kenalog) 0.1 % ointment Apply gently to the affected area three times daily to four times daily      [DISCONTINUED] metFORMIN (Glucophage) 500 mg tablet Take 1 tablet (500 mg) by mouth once every day.       No current facility-administered medications on file prior to visit.       PHYSICAL EXAM    GEN: AaOx4, NAD,   HEENT: normocephalic atraumatic, EOMI, MMM, pupils equal and round  PSYCH: appropriate mood and affect  RESP: nonlabored breathing on   CARDIAC: Extremities WWP, RRR to peripheral palpation  NEURO: CN 2-12 grossly intact  SKIN: Incision clean, dry and intact    Physical exam of the left upper extremity shows no recurrences or masses.  He has full elbow and shoulder range of motion.  Sensation is intact  light touch in all distributions.  He has palpable pulses and brisk cap refill distally.  AIN, PIN, ulnar nerves are intact.    Imaging:    MRI of the left upper extremity with without contrast was obtained and individually reviewed by myself.  This shows no evidence of nodular enhancing areas to suggest local recurrence.  CT scan of the chest shows no obvious areas of metastatic disease but does redemonstrate stable pulmonary nodules.      Assessment:    68-year-old female status post wide resection of a left arm spindle cell sarcoma    Plan:    So far she has been stable surveillance scans.  I plan see her back in 6 months with repeat CT scan of her chest without contrast as well as MRI of her left arm with and without contrast.  She will follow-up with me in 6 months after the scans are obtained.

## 2023-10-11 ENCOUNTER — CLINICAL SUPPORT (OUTPATIENT)
Dept: PRIMARY CARE | Facility: CLINIC | Age: 68
End: 2023-10-11
Payer: COMMERCIAL

## 2023-10-11 DIAGNOSIS — Z00.00 HEALTHCARE MAINTENANCE: Primary | ICD-10-CM

## 2023-10-11 PROCEDURE — G0008 ADMIN INFLUENZA VIRUS VAC: HCPCS | Performed by: PHYSICIAN ASSISTANT

## 2023-10-11 PROCEDURE — 90662 IIV NO PRSV INCREASED AG IM: CPT | Performed by: PHYSICIAN ASSISTANT

## 2023-10-16 DIAGNOSIS — J45.52 SEVERE PERSISTENT ASTHMA WITH STATUS ASTHMATICUS (MULTI): Primary | ICD-10-CM

## 2023-10-20 ENCOUNTER — APPOINTMENT (OUTPATIENT)
Dept: PULMONOLOGY | Facility: HOSPITAL | Age: 68
End: 2023-10-20
Payer: COMMERCIAL

## 2023-10-23 RX ORDER — BUDESONIDE, GLYCOPYRROLATE, AND FORMOTEROL FUMARATE 160; 9; 4.8 UG/1; UG/1; UG/1
2 AEROSOL, METERED RESPIRATORY (INHALATION) 2 TIMES DAILY
Qty: 24 G | Refills: 11 | OUTPATIENT
Start: 2023-10-23 | End: 2023-10-25 | Stop reason: SDUPTHER

## 2023-10-25 ENCOUNTER — TELEPHONE (OUTPATIENT)
Dept: PULMONOLOGY | Facility: HOSPITAL | Age: 68
End: 2023-10-25
Payer: COMMERCIAL

## 2023-10-25 ENCOUNTER — OFFICE VISIT (OUTPATIENT)
Dept: PULMONOLOGY | Facility: HOSPITAL | Age: 68
End: 2023-10-25
Payer: COMMERCIAL

## 2023-10-25 VITALS
TEMPERATURE: 97 F | RESPIRATION RATE: 20 BRPM | BODY MASS INDEX: 33.53 KG/M2 | HEART RATE: 79 BPM | DIASTOLIC BLOOD PRESSURE: 70 MMHG | SYSTOLIC BLOOD PRESSURE: 142 MMHG | WEIGHT: 195.33 LBS | OXYGEN SATURATION: 100 %

## 2023-10-25 DIAGNOSIS — L30.9 ECZEMA, UNSPECIFIED TYPE: ICD-10-CM

## 2023-10-25 DIAGNOSIS — J45.52 SEVERE PERSISTENT ASTHMA WITH STATUS ASTHMATICUS (MULTI): ICD-10-CM

## 2023-10-25 DIAGNOSIS — J44.9 CHRONIC OBSTRUCTIVE PULMONARY DISEASE, UNSPECIFIED COPD TYPE (MULTI): Primary | ICD-10-CM

## 2023-10-25 DIAGNOSIS — J06.9 UPPER RESPIRATORY TRACT INFECTION, UNSPECIFIED TYPE: ICD-10-CM

## 2023-10-25 PROCEDURE — 99215 OFFICE O/P EST HI 40 MIN: CPT | Performed by: NURSE PRACTITIONER

## 2023-10-25 PROCEDURE — 1125F AMNT PAIN NOTED PAIN PRSNT: CPT | Performed by: NURSE PRACTITIONER

## 2023-10-25 PROCEDURE — 1159F MED LIST DOCD IN RCRD: CPT | Performed by: NURSE PRACTITIONER

## 2023-10-25 PROCEDURE — 1160F RVW MEDS BY RX/DR IN RCRD: CPT | Performed by: NURSE PRACTITIONER

## 2023-10-25 PROCEDURE — 4010F ACE/ARB THERAPY RXD/TAKEN: CPT | Performed by: NURSE PRACTITIONER

## 2023-10-25 PROCEDURE — 3044F HG A1C LEVEL LT 7.0%: CPT | Performed by: NURSE PRACTITIONER

## 2023-10-25 PROCEDURE — 3078F DIAST BP <80 MM HG: CPT | Performed by: NURSE PRACTITIONER

## 2023-10-25 PROCEDURE — 3077F SYST BP >= 140 MM HG: CPT | Performed by: NURSE PRACTITIONER

## 2023-10-25 RX ORDER — BUDESONIDE, GLYCOPYRROLATE, AND FORMOTEROL FUMARATE 160; 9; 4.8 UG/1; UG/1; UG/1
2 AEROSOL, METERED RESPIRATORY (INHALATION) 2 TIMES DAILY
Qty: 72 G | Refills: 3 | Status: SHIPPED | OUTPATIENT
Start: 2023-10-25 | End: 2024-10-24

## 2023-10-25 RX ORDER — ALBUTEROL SULFATE 90 UG/1
1-2 AEROSOL, METERED RESPIRATORY (INHALATION) EVERY 6 HOURS PRN
Qty: 54 G | Refills: 3 | Status: SHIPPED | OUTPATIENT
Start: 2023-10-25 | End: 2024-04-10 | Stop reason: SDUPTHER

## 2023-10-25 RX ORDER — FLUTICASONE PROPIONATE 50 MCG
1 SPRAY, SUSPENSION (ML) NASAL DAILY
Qty: 16 G | Refills: 5 | Status: SHIPPED | OUTPATIENT
Start: 2023-10-25 | End: 2024-04-10 | Stop reason: SDUPTHER

## 2023-10-25 ASSESSMENT — ENCOUNTER SYMPTOMS
SINUS PRESSURE: 0
VOICE CHANGE: 0
AGITATION: 0
HEADACHES: 0
ABDOMINAL PAIN: 0
RHINORRHEA: 0
VOMITING: 0
EYE PAIN: 0
NERVOUS/ANXIOUS: 0
FATIGUE: 0
JOINT SWELLING: 0
DIZZINESS: 0
BACK PAIN: 0
WEAKNESS: 0
ARTHRALGIAS: 0
NUMBNESS: 0
FEVER: 0
PALPITATIONS: 0
NAUSEA: 0
MYALGIAS: 0
DIARRHEA: 0

## 2023-10-25 ASSESSMENT — PAIN SCALES - GENERAL: PAINLEVEL: 7

## 2023-10-25 NOTE — PATIENT INSTRUCTIONS
1. COPD/VIRK: Patient states she was diagnosed with both COPD and Asthma about 8 years ago. Currently establishing care with . Hx of TB with hospitalization as a child. PFTs with mild obstruction. Echo with normal EF - impaired relaxation.   - continue Breztri; 2 puffs twice a day -> wash out mouth after use. Call the office if insurance company will not cover.  - continue albuterol HFA inhaler 2 puffs or albuterol nebulizer treatment every 4-6 hours as needed for shortness of breath   - discussed pulmonary rehab down the line - plan to increase activity at home   - discussed increasing activity as tolerated   - will get jakob pre/post and FENO   - will get RAST panel -- blood test      2. Nicotine Dependence; current active smoker. Shared decisionmaking discussion completed with patient for lung cancer screening. She has tried pills (chantix)/patches and it did not work. Resection of sarcoma, currently getting surveillance screening with Ortho. Defer LDS CT at this time.  - >5 min smoking cessation spent with patient - brochure given in clinic today  - she is going to call the smoking hotline to get some free nicotine gum      3. ERA; diagnosed with sleep apnea last year. Has a CPAP device but unsure on how to use it.   - continue to follow with sleep      4. Allergic rhinitis: she has runny nose and post nasal drip   - continue fluticasone (flonase) 1 spray each nostril twice daily   - can get OTC nasal saline -- this can help keep your nasal passages moist     5. Eczema: bothersome symptoms. Will see if more of an asthma like picture could qualify for biologic   - referral to dermatology  Thank you for visiting the Pulmonary clinic today!   Return to clinic 2-3 months with PFTs or sooner if needed   Jessica Hale CNP  My office number is [phone]  Claudine is my  and Tiffanie is my nurse.   Radiology scheduling [phone]   Appointment scheduling [phone]

## 2023-10-25 NOTE — TELEPHONE ENCOUNTER
Left voicemail informing the patient that she needs to call [facility] at [phone] and request a refill.

## 2023-10-25 NOTE — PROGRESS NOTES
Patient: Cathy Curiel    [id number]  : 1955 -- AGE 68 y.o.    Provider: PAYAM Covington-CNP     Location [facility]   Service Date: 10/25/2023              [facility]  Follow up visit note      HISTORY OF PRESENT ILLNESS       HISTORY OF PRESENT ILLNESS   Mrs. Curiel is a 68 year old woman, current smoker (~0.5 PPD for 49 years, ~25 total pack years.) here for follow up for COPD & Asthma last seen in clinic on 23.     She has been using her Breztri twice daily- and albuterol 5-6 x per day. She has been using her nebulizer 1-2 x per month.  She is currently not working and has been not as active. She has been not moving around as much because she needs to use her rescue more. She had her flu shot a week or so ago - feels like she is fighting a cold. She has some chest soreness form coughing so much. She quit smoking for 2 weeks. She states her cough is dry. She has wheezing at night when she lays down at night and with exertion. She has VIRK with going up/ down the stairs. She denies any SOB at rest. She denies sharp chest pains -- just sore from coughing.     She was started on loratadine as needed for a few weeks and did not notice an improvement. She has throbbing in her ears  - told it was clouded over from too many ear infections. She has been using ear drops intermittently that has been helpful. She states what she hears in the right ear is normal, but muffled.  She has itching in the ear canal. She has been using flonase daily at bedtime.  She has eczema - changed soaps, using cold water, and using an ointment. She states none of this has helped. She has been taking hydroxzyzine as needed, but she does not like them because they make her drowsy.  She has been having GERD with red sauce  - takes spoon of mustard and tums.    She was seen by Dr. Arboleda and has a CPAP. She states she needs a cord for her CPAP machine -- states she talked to  the sleep nurse and they ordered it.   Smoking       2/2/23: Since last visit she has been using Breztri daily, albuterol 3 or 4 times with activities such as cleaning or grocery shopping, walking a distance. She is still smoking almost a pack a day. She coughs up yellow mucus first thing in the mornings. Wheezes and has chest tightness, albuterol helps this and the VIRK. She does have some nasal congestion/post nasal drip - she never got the loratadine we talked abou tlast visit. She sometimes wakes up at night and feels like she is choking, drinks some water a goes back to sleep. States she has not been sleeping well lately. She follows sleep and got a new CPAP machine that is not working for her. She is following up to get her settings changed. Has occasional GERD takes Tums or spoon full of mustard. No ED visits for respiratory issues. Up to date on vaccines - just needs next Covid booster. 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 She is still smoking, but is going to call the hotline today for assistance and is motivated to quit.      8/4/22: Since last visit she feels her breathing is about the same. She has VIRK with going up stairs and walking too far on level ground. She has been trying to walk to lose weight. She has had some chest discomfort related to weight and gas. She has been using her breztri twice daily. She uses her rescue 2-3 x per day. She has a nebulizer machine, but only needs treatments once every 2-3 weeks. She has been working on cutting down on smoking, but is still smoking a pack a day. She has a productive cough with clear sputum - more in the morning. She does have some runny nose and throat clearing. She notices some wheezing every once in a while - more so when she smokes to much. She denies any SOB at rest, or CP. She denies any issues with GERD, but has been having gas. She states this has been worse since she has gained weight.  "She feels it around her back under her breasts. She has been doing mustard and water or gas relief - she feels this helps. She feels the mustard helps with both gas as well as Galo horses. She had been following with GI at [facility] whom she had a disagreement with -- which is why she transferred care to . We discussed related to the significance of her gas pains it could be beneficial to establish with GI here. She has a CPAP machine and has had issues with setting it. She does notice some ankle edema if she is standing barbecuing for an extended period of time.   CAT today 19   ACT today 10      5/23/22: Patient presents to pulmonary clinic today for initially evaluation of COPD & Asthma. Was seen by her PCP on 4/4/22; of which was noted that PCP gave patient Breztri to try; and patient instructed to hold her Advair while trialing this medication. Was able to use the Breztri for 14 days before it ran out - felt like she was breathing a little better. Was diagnosed with Asthma and COPD 8 years ago per patient. Started smoking at age 17 - still smoking today. Currently smoking about 1ppd for the past few years, but on average she states about 6-8 cigarettes/day over time. She was able to quit smoking for about 1.5 years, but started back again. Has tried patches, Chantix (bad nightmares). Upon walking up her 12 stairs at her home - she gets very short of breath. Is able to walk on a flat surface without difficulty for about 15 minutes. Able to shower and get dressed w/o difficulty. Stopped working about 2 years ago - ever since then, breathing has gotten progressively worse. Using her Albuterol HFA typically twice a day everyday. If she is being more active - requires more.  -- She also denies orthopnea. Claims post nasal drip - generic OTC \"allergy relief\" medicine. Uses PRN - has not needed it this year yet. Denies lower leg edema. Her weight has been mostly stable. She also relates chronic cough. Cough is " productive. Sputum color is clear; no hemoptysis. Claims wheezing; every once in awhile. No night cough. No fever, shivering chills, +night sweats (claims hormonal). Very rare/occasional symptoms of heartburn - watches what she eats. She denies chest pain.  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 Never on oxygen. She has never been to pulmonary rehab.       ALLERGIES AND MEDICATIONS     ALLERGIES  Allergies   Allergen Reactions    Azithromycin Hives, Shortness of breath and Swelling     feet swell and blister    Meloxicam Hives and Unknown    Etodolac Other     HA    Penicillin Swelling    Penicillins Unknown    Benzonatate Hives, Itching and Rash    Iodinated Contrast Media Rash    Latex Rash       MEDICATIONS  Current Outpatient Medications   Medication Sig Dispense Refill    acetaminophen (Tylenol) 500 mg tablet Take 2 tablets (1,000 mg) by mouth if needed.      albuterol 90 mcg/actuation inhaler Inhale 1-2 puffs. Every 4 to 6 hours as needed      alcohol swabs (Alcohol Prep Pads) pads, medicated Apply 1 Pad topically once daily. 100 each 2    azelastine (Astelin) 137 mcg (0.1 %) nasal spray Administer 1 spray into each nostril 2 times a day. Use in each nostril as directed 30 mL 3    blood sugar diagnostic (OneTouch Ultra Test) strip TEST ONCE DAILY 100 strip 3    budesonide-glycopyr-formoterol (Breztri Aerosphere) 160-9-4.8 mcg/actuation HFA aerosol inhaler Inhale 2 puffs 2 times a day. 24 g 11    fluticasone (Flonase) 50 mcg/actuation nasal spray Administer 1 spray into each nostril once daily. Shake gently. Before first use, prime pump. After use, clean tip and replace cap. 16 g 5    FreeStyle glucose monitoring (OneTouch Ultra2 Meter) kit 1 each once daily. 1 each 0    hydrOXYzine HCL (Atarax) 25 mg tablet Take 1 tablet (25 mg) by mouth 3 times a day as needed for itching. 270 tablet 0    lancets  ([facility] Safety Lancet) 30 gauge misc 1 Lancet once daily. 100 each 3    losartan (Cozaar) 100 mg tablet Take 1 tablet (100 mg) by mouth once every day.      metFORMIN (Glucophage) 500 mg tablet Take 1 tablet (500 mg) by mouth once every day. 90 tablet 1    mv,rex,min/iron/folic acid/lut (COMPLETE MULTI ORAL) Take 1 capsule by mouth 1 (one) time each day.      rosuvastatin (Crestor) 40 mg tablet Take 1 tablet (40 mg) by mouth once every day. 90 tablet 1    triamcinolone (Kenalog) 0.1 % ointment Apply gently to the affected area three times daily to four times daily       No current facility-administered medications for this visit.         PAST HISTORY     PAST MEDICAL HISTORY  Current/Past Inhalers & Nebulized Medications:   - Advair  - Albuterol HFA PRN  - Albuterol Nebulizer PRN  - Spiriva Handihaler  - Symbicort (past)     Pulmonary/Significant Hospitalization History:  - 2 year hospitalization age 6 for TB     Sleep History:  - Has a CPAP machine; got it last year. Unsure on how to use it. Only tried it 2-3 times. Has been referred to Sleep by PCP.     Significant Comorbidities:  - COPD  - HTN  - ERA  - HLD  - DM2  - Hx of TB (Age 6)    PAST SURGICAL HISTORY  Past Surgical History:   Procedure Laterality Date    CT GUIDED PERCUTANEOUS BIOPSY BONE DEEP  8/29/2022    CT GUIDED PERCUTANEOUS BIOPSY BONE DEEP 8/29/2022 [facility] [place] [facility]    OTHER SURGICAL HISTORY  04/04/2022    Shoulder arthroscopy       IMMUNIZATION HISTORY  Immunization History   Administered Date(s) Administered    Flu vaccine, quadrivalent, high-dose, preservative free, age 65y+ (FLUZONE) 10/11/2023    Influenza, seasonal, injectable 11/10/2021    Moderna SARS-CoV-2 Vaccination 03/26/2021, 04/23/2021, 12/20/2021    Pneumococcal polysaccharide vaccine, 23-valent, age 2 years and older (PNEUMOVAX 23) 10/14/2014    Tdap vaccine, age 7 year and older (BOOSTRIX) 06/08/2021       SOCIAL HISTORY  Smoking: current smoker (~0.5 PPD for 49 years, ~25  total pack years.)  Vaping: none  Alcohol Use: none  Illicit Drug Use: Occasional marijuana use (blunt); once every couple months for sleep aid.     OCCUPATIONAL/ENVIRONMENTAL HISTORY  Previously worked as: Factory work, fast food.  Currently works as: Unemployed  No known exposure to asbestos, silica, beryllium or inhaled metals.  No exposure to birds or exotic animals.    FAMILY HISTORY  Family History   Problem Relation Name Age of Onset    Uterine cancer Mother      Hypertension Mother          BENIGN    Other (CVA) Father      Hypertension Father      Throat cancer Brother       - +Fam Hx of Sarcoidosis  - Strong family hx of Sarcoidosis; 7 out of 9 first cousins have it.  - Mother; uterine CA  - Brother; throat CA    RESULTS/DATA     Pulmonary Function Test Results       PFTs: 8/4/22: FEV1/FVC 0.55/ FEV1 1.67 (82%)/ FVC 3.01 (116%)/ TLC 5.11 (115%)/ DLCO 62% -- FEF 25/75 27%      6 MWTs: 8/4/22 - 404m (,) 97 -> 95% on RA, DARCIE 0     Chest Radiograph     XR chest 2 views 01/13/2022  IMPRESSION:  No acute cardiopulmonary abnormality identified.    MACRO: None      Chest CT Scan     - 6/24/22 1  -Few small lung nodules measuring up to 5 mm as above, likely benign. Continued annual screening with low-dose noncontrast chest CT is recommended. Mild emphysema. Prior granulomatous exposure. Borderline cardiomegaly. Moderate coronary artery calcifications, indicating the presence of coronary artery disease.   - 9/30/22 - Stable pulmonary nodules. Fatty infiltration of the liver. No features to suggest metastatic disease or disease progression  - 2/28/23 - No evidence of metastatic disease to the chest. Few stable lung  nodules measuring up to 5 mm.  2. Moderate coronary artery calcifications. Please note that, the  present study is not tailored for evaluation of coronary arteries.  3. Similar asymmetric enlargement and heterogeneous appearance of the  right lobe of thyroid gland. Consider thyroid ultrasound for  further  characterization, if clinically warranted.  - 9/25/23 - 1. Stable pulmonary nodules measuring up to 5 mm in the left upper  lobe, likely benign. 2. Mild upper lung predominant emphysema. 3. Moderate coronary artery calcifications. 4. Additional findings as above    Echocardiogram     Echocardiogram: 6/8/22 - EF 65%, Diastolic dysfunction. RA normal size, RV normal size and function       Other testing      CBC  - 6/30/22 - Eos 160   - 8/8/23 - Eos 130      -------------      OS      CT Chest (From [facility] Records - Impression Only)   -6/23/21: IMPRESSION: Centrilobular emphysema without new or enlarging pulmonary nodule. Stable scattered 2 mm nodules and intrapulmonary lymph node in the left upper lobe. Lung-RADS Category 2 (Benign appearance or behavior). Continued annual screening in 12 months with a CT CHEST LOW DOSE LUNG CANCER SCREENING is recommended.        REVIEW OF SYSTEMS     REVIEW OF SYSTEMS  Review of Systems   Constitutional:  Negative for fatigue and fever.   HENT:  Negative for congestion, postnasal drip, rhinorrhea, sinus pressure and voice change.    Eyes:  Negative for pain and visual disturbance.   Cardiovascular:  Negative for chest pain, palpitations and leg swelling.   Gastrointestinal:  Negative for abdominal pain, diarrhea, nausea and vomiting.   Endocrine: Negative for cold intolerance and heat intolerance.   Musculoskeletal:  Negative for arthralgias, back pain, joint swelling and myalgias.   Skin:  Negative for rash.   Neurological:  Negative for dizziness, weakness, numbness and headaches.   Psychiatric/Behavioral:  Negative for agitation. The patient is not nervous/anxious.          PHYSICAL EXAM     VITAL SIGNS: /70 (BP Location: Left arm, Patient Position: Sitting, BP Cuff Size: Large adult)   Pulse 79   Temp 36.1 °C (97 °F) (Temporal)   Resp 20   Wt 88.6 kg (195 lb 5.2 oz)   SpO2 100%   BMI 33.53 kg/m²      CURRENT WEIGHT: [unfilled]  BMI: [unfilled]  PREVIOUS  WEIGHTS:  Wt Readings from Last 3 Encounters:   10/25/23 88.6 kg (195 lb 5.2 oz)   08/21/23 88.5 kg (195 lb)   08/08/23 88.5 kg (195 lb)       Physical Exam  Vitals reviewed.   Constitutional:       General: She is not in acute distress.     Appearance: Normal appearance. She is not ill-appearing or toxic-appearing.   HENT:      Head: Normocephalic.      Nose: No rhinorrhea.   Cardiovascular:      Rate and Rhythm: Normal rate and regular rhythm.      Heart sounds: Normal heart sounds.   Pulmonary:      Effort: Pulmonary effort is normal. No respiratory distress.      Breath sounds: Normal breath sounds. No stridor.   Abdominal:      General: Abdomen is flat.   Musculoskeletal:         General: No swelling. Normal range of motion.   Skin:     General: Skin is warm and dry.      Nails: There is no clubbing.   Neurological:      General: No focal deficit present.      Mental Status: She is alert.   Psychiatric:         Mood and Affect: Mood normal.         Behavior: Behavior normal.         Judgment: Judgment normal.         ASSESSMENT/PLAN   1. COPD/VIRK: Patient states she was diagnosed with both COPD and Asthma about 8 years ago.  Hx of TB with hospitalization as a child. PFTs with mild obstruction. Echo with normal EF - impaired relaxation.   - continue Breztri; 2 puffs twice a day -> wash out mouth after use. Call the office if insurance company will not cover.  - continue albuterol HFA inhaler 2 puffs or albuterol nebulizer treatment every 4-6 hours as needed for shortness of breath   - discussed pulmonary rehab down the line - plan to increase activity at home   - discussed increasing activity as tolerated   - will get jakob pre/post and FENO   - will get RAST panel -- blood test - may benefit from biologic (possible dupixent related to her eczema)     2. Nicotine Dependence; current active smoker. Shared decisionmaking discussion completed with patient for lung cancer screening. She has tried pills  (chantix)/patches and it did not work. Resection of sarcoma, currently getting surveillance screening with Ortho. Defer LDS CT at this time.  - >5 min smoking cessation spent with patient - brochure given in clinic today  - she is going to call the smoking hotline to get some free nicotine gum      3. ERA; diagnosed with sleep apnea last year. Has a CPAP device but unsure on how to use it.   - continue to follow with sleep      4. Allergic rhinitis: she has runny nose and post nasal drip   - continue fluticasone (flonase) 1 spray each nostril twice daily     5. Eczema: bothersome symptoms. Will see if more of an asthma like picture could qualify for biologic   - referral to dermatology

## 2024-01-09 ENCOUNTER — LAB (OUTPATIENT)
Dept: LAB | Facility: LAB | Age: 69
End: 2024-01-09
Payer: COMMERCIAL

## 2024-01-09 ENCOUNTER — OFFICE VISIT (OUTPATIENT)
Dept: PRIMARY CARE | Facility: CLINIC | Age: 69
End: 2024-01-09
Payer: COMMERCIAL

## 2024-01-09 VITALS
SYSTOLIC BLOOD PRESSURE: 119 MMHG | TEMPERATURE: 98 F | DIASTOLIC BLOOD PRESSURE: 70 MMHG | WEIGHT: 191 LBS | BODY MASS INDEX: 32.79 KG/M2 | OXYGEN SATURATION: 97 % | HEART RATE: 78 BPM

## 2024-01-09 DIAGNOSIS — J44.9 CHRONIC OBSTRUCTIVE PULMONARY DISEASE, UNSPECIFIED COPD TYPE (MULTI): ICD-10-CM

## 2024-01-09 DIAGNOSIS — E78.5 HYPERLIPIDEMIA, UNSPECIFIED HYPERLIPIDEMIA TYPE: ICD-10-CM

## 2024-01-09 DIAGNOSIS — J06.9 UPPER RESPIRATORY TRACT INFECTION, UNSPECIFIED TYPE: ICD-10-CM

## 2024-01-09 DIAGNOSIS — J45.52 SEVERE PERSISTENT ASTHMA WITH STATUS ASTHMATICUS (MULTI): ICD-10-CM

## 2024-01-09 DIAGNOSIS — L30.9 ECZEMA, UNSPECIFIED TYPE: ICD-10-CM

## 2024-01-09 DIAGNOSIS — E11.9 CONTROLLED TYPE 2 DIABETES MELLITUS WITHOUT COMPLICATION, WITHOUT LONG-TERM CURRENT USE OF INSULIN (MULTI): ICD-10-CM

## 2024-01-09 DIAGNOSIS — I10 PRIMARY HYPERTENSION: ICD-10-CM

## 2024-01-09 DIAGNOSIS — E11.9 CONTROLLED TYPE 2 DIABETES MELLITUS WITHOUT COMPLICATION, WITHOUT LONG-TERM CURRENT USE OF INSULIN (MULTI): Primary | ICD-10-CM

## 2024-01-09 DIAGNOSIS — C49.9 SPINDLE CELL SARCOMA (MULTI): ICD-10-CM

## 2024-01-09 PROBLEM — E66.9 OBESITY WITH BODY MASS INDEX 30 OR GREATER: Status: ACTIVE | Noted: 2022-09-13

## 2024-01-09 LAB
ALBUMIN SERPL BCP-MCNC: 4.2 G/DL (ref 3.4–5)
ALP SERPL-CCNC: 60 U/L (ref 33–136)
ALT SERPL W P-5'-P-CCNC: 13 U/L (ref 7–45)
ANION GAP SERPL CALC-SCNC: 13 MMOL/L (ref 10–20)
AST SERPL W P-5'-P-CCNC: 16 U/L (ref 9–39)
BILIRUB SERPL-MCNC: 0.3 MG/DL (ref 0–1.2)
BUN SERPL-MCNC: 14 MG/DL (ref 6–23)
CALCIUM SERPL-MCNC: 9.5 MG/DL (ref 8.6–10.6)
CHLORIDE SERPL-SCNC: 107 MMOL/L (ref 98–107)
CO2 SERPL-SCNC: 27 MMOL/L (ref 21–32)
CREAT SERPL-MCNC: 0.99 MG/DL (ref 0.5–1.05)
EGFRCR SERPLBLD CKD-EPI 2021: 63 ML/MIN/1.73M*2
EST. AVERAGE GLUCOSE BLD GHB EST-MCNC: 134 MG/DL
GLUCOSE SERPL-MCNC: 111 MG/DL (ref 74–99)
HBA1C MFR BLD: 6.3 %
POTASSIUM SERPL-SCNC: 4.6 MMOL/L (ref 3.5–5.3)
PROT SERPL-MCNC: 7 G/DL (ref 6.4–8.2)
SODIUM SERPL-SCNC: 142 MMOL/L (ref 136–145)

## 2024-01-09 PROCEDURE — 3044F HG A1C LEVEL LT 7.0%: CPT | Performed by: PHYSICIAN ASSISTANT

## 2024-01-09 PROCEDURE — 1159F MED LIST DOCD IN RCRD: CPT | Performed by: PHYSICIAN ASSISTANT

## 2024-01-09 PROCEDURE — 1125F AMNT PAIN NOTED PAIN PRSNT: CPT | Performed by: PHYSICIAN ASSISTANT

## 2024-01-09 PROCEDURE — 4010F ACE/ARB THERAPY RXD/TAKEN: CPT | Performed by: PHYSICIAN ASSISTANT

## 2024-01-09 PROCEDURE — 80053 COMPREHEN METABOLIC PANEL: CPT

## 2024-01-09 PROCEDURE — 3078F DIAST BP <80 MM HG: CPT | Performed by: PHYSICIAN ASSISTANT

## 2024-01-09 PROCEDURE — 36415 COLL VENOUS BLD VENIPUNCTURE: CPT

## 2024-01-09 PROCEDURE — 3074F SYST BP LT 130 MM HG: CPT | Performed by: PHYSICIAN ASSISTANT

## 2024-01-09 PROCEDURE — 82785 ASSAY OF IGE: CPT

## 2024-01-09 PROCEDURE — 83036 HEMOGLOBIN GLYCOSYLATED A1C: CPT

## 2024-01-09 PROCEDURE — 99214 OFFICE O/P EST MOD 30 MIN: CPT | Performed by: PHYSICIAN ASSISTANT

## 2024-01-09 PROCEDURE — 86003 ALLG SPEC IGE CRUDE XTRC EA: CPT

## 2024-01-09 RX ORDER — LOSARTAN POTASSIUM 100 MG/1
100 TABLET ORAL
Qty: 90 TABLET | Refills: 1 | Status: SHIPPED | OUTPATIENT
Start: 2024-01-09 | End: 2024-05-14 | Stop reason: ALTCHOICE

## 2024-01-09 RX ORDER — ROSUVASTATIN CALCIUM 40 MG/1
40 TABLET, COATED ORAL
Qty: 90 TABLET | Refills: 1 | Status: SHIPPED | OUTPATIENT
Start: 2024-01-09 | End: 2024-05-14 | Stop reason: SDUPTHER

## 2024-01-09 RX ORDER — METFORMIN HYDROCHLORIDE 500 MG/1
500 TABLET ORAL
Qty: 90 TABLET | Refills: 1 | Status: SHIPPED | OUTPATIENT
Start: 2024-01-09 | End: 2024-05-14 | Stop reason: SDUPTHER

## 2024-01-09 RX ORDER — ALBUTEROL SULFATE 0.83 MG/ML
2.5 SOLUTION RESPIRATORY (INHALATION) 4 TIMES DAILY PRN
Qty: 540 ML | Refills: 3 | Status: SHIPPED | OUTPATIENT
Start: 2024-01-09 | End: 2024-04-10 | Stop reason: SDUPTHER

## 2024-01-09 RX ORDER — PREDNISONE 10 MG/1
TABLET ORAL
Qty: 1 EACH | Refills: 0 | Status: SHIPPED | OUTPATIENT
Start: 2024-01-09 | End: 2024-04-10 | Stop reason: WASHOUT

## 2024-01-09 NOTE — PROGRESS NOTES
Subjective   Cathy Curiel is a 68 y.o. female who presents for Chills (head), Headache, Cough, and Fever. Generally doing well. Currently c/o:    Occurring for the past 2 weeks, worsening. Endorses fevers, chills, sinus pressure, HA, cough, light yellow sputum, right ear pain, sore throat, hoarseness. States her sister has PNA and she was recently in contact with her, states she wore a mask around her. Has tried halls, coricidin cold and cough (antihistamine and dextromethorphan), flonase. Tested for COVID 1/5 and 1/7, which were both negative,     HTN, HLD: Stable on losartan 100mg. Does check BP at home, runs 128-138/76-84. Denies symptoms dizziness, CP, SOB/VIRK, leg swelling. Cannot take baby ASA d/t prior lower GI bleed.      T2DM: Managed with Metformin 500mg. She is checking BG levels at home, running 80-120s fasting. Last A1C 6.1% 8/2023. Urine albumin UTD, last 8/2023. Needs to schedule for diabetic foot and eye exams.      COPD/asthma, lung nodules, nicotine use: Managed with Breztri 2x daily and albuterol PRN/bebulizer. Rinses mouth with Breztri use. Denies coughing, wheezing, SOB. Smoking daily. Follows with pulmonology.     ERA: Non-compliant with CPAP d/t improper fit and titration. She is planning on scheduling with sleep medicine for further management. Notes having headaches every morning upon waking up, about every other week. Also notes excessive daytime sleepiness.     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 She notes she is still having a lot of pain, not relieved by tylenol. Completed MRI humerus 9/2023, which showed postsurgical scarring without evidence of residual neoplasm. Follows with orthopedic surgery.     GERD: managed with TUMS and mustard. Avoids triggering foods.      Health Maintenance:  Immunizations:   -Flu: UTD, 2023   -Shingrix: Recommended, obtain from pharmacy  -Pneumococcal: deferred   Mammogram UTD (last 8/30/23)  PAP - not indicated d/t  age  Colonoscopy: UTD, due in 2024    DEXA bone density screening due - ordered 3/14/23   CT cardiac scoring UTD (last 2/28/2023)  CT chest low dose screening for lung cancer - not indicated d/t annual CT chest d/t spindle cell carcinoma.     Last MCR / CPE: 8/8/23 (MCR ADV)    12 point ROS reviewed and negative other than as stated in HPI    /70   Pulse 78   Temp 36.7 °C (98 °F)   Wt 86.6 kg (191 lb)   SpO2 97%   BMI 32.79 kg/m²   Objective   Physical Exam  Vitals reviewed.   Constitutional:       General: She is not in acute distress.     Appearance: Normal appearance. She is not ill-appearing.   HENT:      Head: Normocephalic and atraumatic.   Eyes:      General: No scleral icterus.     Extraocular Movements: Extraocular movements intact.      Conjunctiva/sclera: Conjunctivae normal.      Pupils: Pupils are equal, round, and reactive to light.   Cardiovascular:      Rate and Rhythm: Normal rate and regular rhythm.      Heart sounds: Normal heart sounds. No murmur heard.     No friction rub. No gallop.   Pulmonary:      Effort: Pulmonary effort is normal. No respiratory distress.      Breath sounds: Normal breath sounds. No stridor. No wheezing, rhonchi or rales.   Musculoskeletal:      Cervical back: Normal range of motion.      Right lower leg: No edema.      Left lower leg: No edema.   Skin:     General: Skin is warm and dry.   Neurological:      Mental Status: She is alert and oriented to person, place, and time. Mental status is at baseline.      Cranial Nerves: No cranial nerve deficit.      Gait: Gait normal.   Psychiatric:         Mood and Affect: Mood normal.         Behavior: Behavior normal.         Assessment/Plan   Problem List Items Addressed This Visit       Chronic obstructive pulmonary disease, unspecified COPD type (CMS/HCC)     Stable on Breztri and albuterol.  Follows with pulmonology         Relevant Medications    albuterol 2.5 mg /3 mL (0.083 %) nebulizer solution    Controlled  type 2 diabetes mellitus without complication, without long-term current use of insulin (CMS/[facility])     Continue metformin 500mg daily. Continue checking BG levels closely. Hemoglobin A1c ordered. Follow an ADA diet         Relevant Medications    metFORMIN (Glucophage) 500 mg tablet    Other Relevant Orders    Hemoglobin A1c (Completed)    Comprehensive metabolic panel (Completed)    Hyperlipidemia     Stable. Continue rosuvastatin 40mg. Follow a mediterranean style diet.          Relevant Medications    rosuvastatin (Crestor) 40 mg tablet    Hypertension - Primary     Continue losartan 100mg. Follow a DASH diet.          Relevant Medications    losartan (Cozaar) 100 mg tablet    Upper respiratory tract infection     Begin prednisone 10mg dose pack. Take coricidin and flonase for symptom control. If no improvement, may consider adding on ATBs.          Relevant Medications    predniSONE 10 mg tablets,dose pack        Follow up in 6 months for CPE or sooner as needed

## 2024-01-10 LAB
A ALTERNATA IGE QN: <0.1 KU/L
A FUMIGATUS IGE QN: <0.1 KU/L
BERMUDA GRASS IGE QN: 1.99 KU/L
BOXELDER IGE QN: <0.1 KU/L
C HERBARUM IGE QN: <0.1 KU/L
CALIF WALNUT POLN IGE QN: <0.1 KU/L
CAT DANDER IGE QN: <0.1 KU/L
CMN PIGWEED IGE QN: <0.1 KU/L
COMMON RAGWEED IGE QN: 0.11 KU/L
COTTONWOOD IGE QN: <0.1 KU/L
D FARINAE IGE QN: <0.1 KU/L
D PTERONYSS IGE QN: <0.1 KU/L
DOG DANDER IGE QN: 0.29 KU/L
ENGL PLANTAIN IGE QN: 0.11 KU/L
GOOSEFOOT IGE QN: <0.1 KU/L
JOHNSON GRASS IGE QN: 2.31 KU/L
KENT BLUE GRASS IGE QN: 27.6 KU/L
LONDON PLANE IGE QN: <0.1 KU/L
MT JUNIPER IGE QN: <0.1 KU/L
P NOTATUM IGE QN: <0.1 KU/L
PECAN/HICK TREE IGE QN: <0.1 KU/L
ROACH IGE QN: 0.24 KU/L
SALTWORT IGE QN: <0.1 KU/L
SHEEP SORREL IGE QN: <0.1 KU/L
SILVER BIRCH IGE QN: <0.1 KU/L
TIMOTHY IGE QN: 19.6 KU/L
TOTAL IGE SMQN RAST: 363 KU/L
WHITE ASH IGE QN: <0.1 KU/L
WHITE ELM IGE QN: <0.1 KU/L
WHITE MULBERRY IGE QN: <0.1 KU/L
WHITE OAK IGE QN: <0.1 KU/L

## 2024-01-14 NOTE — RESULT ENCOUNTER NOTE
Hemoglobin A1c was 6.3%, showing great control of diabetes. Continue metformin and following a diabetic diet.     Remainder of labs look good

## 2024-01-14 NOTE — ASSESSMENT & PLAN NOTE
Begin prednisone 10mg dose pack. Take coricidin and flonase for symptom control. If no improvement, may consider adding on ATBs.

## 2024-01-14 NOTE — ASSESSMENT & PLAN NOTE
Continue metformin 500mg daily. Continue checking BG levels closely. Hemoglobin A1c ordered. Follow an ADA diet

## 2024-02-02 ENCOUNTER — APPOINTMENT (OUTPATIENT)
Dept: RESPIRATORY THERAPY | Facility: HOSPITAL | Age: 69
End: 2024-02-02
Payer: COMMERCIAL

## 2024-02-08 ENCOUNTER — HOSPITAL ENCOUNTER (OUTPATIENT)
Dept: RESPIRATORY THERAPY | Facility: HOSPITAL | Age: 69
Discharge: HOME | End: 2024-02-08
Payer: COMMERCIAL

## 2024-02-08 DIAGNOSIS — J45.52 SEVERE PERSISTENT ASTHMA WITH STATUS ASTHMATICUS (MULTI): ICD-10-CM

## 2024-02-08 DIAGNOSIS — J44.9 CHRONIC OBSTRUCTIVE PULMONARY DISEASE, UNSPECIFIED COPD TYPE (MULTI): ICD-10-CM

## 2024-02-08 PROCEDURE — 95012 NITRIC OXIDE EXP GAS DETER: CPT | Performed by: INTERNAL MEDICINE

## 2024-02-08 PROCEDURE — 94060 EVALUATION OF WHEEZING: CPT | Performed by: INTERNAL MEDICINE

## 2024-02-08 PROCEDURE — 95012 NITRIC OXIDE EXP GAS DETER: CPT

## 2024-02-09 LAB
MGC ASCENT PFT - FEV1 - POST: 1.56
MGC ASCENT PFT - FEV1 - PRE: 1.37
MGC ASCENT PFT - FEV1 - PREDICTED: 2.24
MGC ASCENT PFT - FVC - POST: 2.95
MGC ASCENT PFT - FVC - PRE: 2.69
MGC ASCENT PFT - FVC - PREDICTED: 2.86

## 2024-02-26 ENCOUNTER — TELEPHONE (OUTPATIENT)
Dept: ORTHOPEDIC SURGERY | Facility: HOSPITAL | Age: 69
End: 2024-02-26
Payer: COMMERCIAL

## 2024-02-26 DIAGNOSIS — C49.9 SPINDLE CELL SARCOMA (MULTI): Primary | ICD-10-CM

## 2024-02-26 RX ORDER — ALPRAZOLAM 1 MG/1
1 TABLET ORAL ONCE
Qty: 2 TABLET | Refills: 0 | Status: SHIPPED | OUTPATIENT
Start: 2024-02-26 | End: 2024-04-23 | Stop reason: SDUPTHER

## 2024-03-25 ENCOUNTER — HOSPITAL ENCOUNTER (OUTPATIENT)
Dept: RADIOLOGY | Facility: HOSPITAL | Age: 69
Discharge: HOME | End: 2024-03-25
Payer: COMMERCIAL

## 2024-03-25 DIAGNOSIS — C49.9 SPINDLE CELL SARCOMA (MULTI): ICD-10-CM

## 2024-03-25 PROCEDURE — 73220 MRI UPPR EXTREMITY W/O&W/DYE: CPT | Mod: LEFT SIDE | Performed by: RADIOLOGY

## 2024-03-25 PROCEDURE — A9575 INJ GADOTERATE MEGLUMI 0.1ML: HCPCS | Performed by: STUDENT IN AN ORGANIZED HEALTH CARE EDUCATION/TRAINING PROGRAM

## 2024-03-25 PROCEDURE — 71250 CT THORAX DX C-: CPT

## 2024-03-25 PROCEDURE — 71250 CT THORAX DX C-: CPT | Performed by: RADIOLOGY

## 2024-03-25 PROCEDURE — 2550000001 HC RX 255 CONTRASTS: Performed by: STUDENT IN AN ORGANIZED HEALTH CARE EDUCATION/TRAINING PROGRAM

## 2024-03-25 PROCEDURE — 73220 MRI UPPR EXTREMITY W/O&W/DYE: CPT | Mod: LT

## 2024-03-25 RX ORDER — GADOTERATE MEGLUMINE 376.9 MG/ML
20 INJECTION INTRAVENOUS
Status: COMPLETED | OUTPATIENT
Start: 2024-03-25 | End: 2024-03-25

## 2024-03-25 RX ADMIN — GADOTERATE MEGLUMINE 20 ML: 376.9 INJECTION INTRAVENOUS at 11:52

## 2024-04-10 ENCOUNTER — OFFICE VISIT (OUTPATIENT)
Dept: PULMONOLOGY | Facility: HOSPITAL | Age: 69
End: 2024-04-10
Payer: COMMERCIAL

## 2024-04-10 VITALS
TEMPERATURE: 96.8 F | OXYGEN SATURATION: 100 % | SYSTOLIC BLOOD PRESSURE: 154 MMHG | WEIGHT: 197.75 LBS | HEART RATE: 83 BPM | DIASTOLIC BLOOD PRESSURE: 70 MMHG | BODY MASS INDEX: 35.03 KG/M2 | RESPIRATION RATE: 18 BRPM

## 2024-04-10 DIAGNOSIS — J06.9 UPPER RESPIRATORY TRACT INFECTION, UNSPECIFIED TYPE: ICD-10-CM

## 2024-04-10 DIAGNOSIS — J45.52 SEVERE PERSISTENT ASTHMA WITH STATUS ASTHMATICUS (MULTI): Primary | ICD-10-CM

## 2024-04-10 DIAGNOSIS — L30.9 ECZEMA, UNSPECIFIED TYPE: ICD-10-CM

## 2024-04-10 DIAGNOSIS — J44.9 CHRONIC OBSTRUCTIVE PULMONARY DISEASE, UNSPECIFIED COPD TYPE (MULTI): ICD-10-CM

## 2024-04-10 DIAGNOSIS — R07.89 CHEST HEAVINESS: ICD-10-CM

## 2024-04-10 PROCEDURE — 3078F DIAST BP <80 MM HG: CPT | Performed by: NURSE PRACTITIONER

## 2024-04-10 PROCEDURE — 99215 OFFICE O/P EST HI 40 MIN: CPT | Performed by: NURSE PRACTITIONER

## 2024-04-10 PROCEDURE — 4010F ACE/ARB THERAPY RXD/TAKEN: CPT | Performed by: NURSE PRACTITIONER

## 2024-04-10 PROCEDURE — 1159F MED LIST DOCD IN RCRD: CPT | Performed by: NURSE PRACTITIONER

## 2024-04-10 PROCEDURE — 3044F HG A1C LEVEL LT 7.0%: CPT | Performed by: NURSE PRACTITIONER

## 2024-04-10 PROCEDURE — 1160F RVW MEDS BY RX/DR IN RCRD: CPT | Performed by: NURSE PRACTITIONER

## 2024-04-10 PROCEDURE — 1125F AMNT PAIN NOTED PAIN PRSNT: CPT | Performed by: NURSE PRACTITIONER

## 2024-04-10 PROCEDURE — 3077F SYST BP >= 140 MM HG: CPT | Performed by: NURSE PRACTITIONER

## 2024-04-10 RX ORDER — LANOLIN ALCOHOL/MO/W.PET/CERES
1000 CREAM (GRAM) TOPICAL DAILY
COMMUNITY

## 2024-04-10 RX ORDER — ALBUTEROL SULFATE 90 UG/1
1-2 AEROSOL, METERED RESPIRATORY (INHALATION) EVERY 6 HOURS PRN
Qty: 54 G | Refills: 3 | Status: SHIPPED | OUTPATIENT
Start: 2024-04-10 | End: 2025-04-10

## 2024-04-10 RX ORDER — DUPILUMAB 300 MG/2ML
600 INJECTION, SOLUTION SUBCUTANEOUS ONCE
Qty: 4 ML | Refills: 0 | Status: SHIPPED | OUTPATIENT
Start: 2024-04-10 | End: 2024-04-10

## 2024-04-10 RX ORDER — FLUTICASONE PROPIONATE 50 MCG
1 SPRAY, SUSPENSION (ML) NASAL DAILY
Qty: 16 G | Refills: 5 | Status: SHIPPED | OUTPATIENT
Start: 2024-04-10 | End: 2025-04-10

## 2024-04-10 RX ORDER — ALBUTEROL SULFATE 0.83 MG/ML
2.5 SOLUTION RESPIRATORY (INHALATION) 4 TIMES DAILY PRN
Qty: 540 ML | Refills: 3 | Status: SHIPPED | OUTPATIENT
Start: 2024-04-10 | End: 2025-04-10

## 2024-04-10 ASSESSMENT — ENCOUNTER SYMPTOMS
HEADACHES: 0
EYE PAIN: 0
JOINT SWELLING: 0
BACK PAIN: 0
RHINORRHEA: 0
ARTHRALGIAS: 0
NERVOUS/ANXIOUS: 0
DIZZINESS: 0
PALPITATIONS: 0
NAUSEA: 0
WEAKNESS: 0
MYALGIAS: 0
SINUS PRESSURE: 0
VOMITING: 0
FEVER: 0
NUMBNESS: 0
VOICE CHANGE: 0
FATIGUE: 0
AGITATION: 0
DIARRHEA: 0
ABDOMINAL PAIN: 0

## 2024-04-10 ASSESSMENT — PAIN SCALES - GENERAL: PAINLEVEL: 9

## 2024-04-10 NOTE — PATIENT INSTRUCTIONS
1. COPD/VIRK: Patient states she was diagnosed with both COPD and Asthma about 8 years ago.  Hx of TB with hospitalization as a child. PFTs with mild obstruction. Echo with normal EF - impaired relaxation.   - continue Breztri; 2 puffs twice a day -> wash out mouth after use.   - continue albuterol HFA inhaler 2 puffs or albuterol nebulizer treatment every 4-6 hours as needed for shortness of breath   - discussed pulmonary rehab down the line - plan to increase activity at home   - discussed increasing activity as tolerated   - will start paperwork for dupixent - asthma and eczema      2. Nicotine Dependence; current active smoker. Shared decisionmaking discussion completed with patient for lung cancer screening. She has tried pills (chantix)/patches and it did not work. Resection of sarcoma, currently getting surveillance screening with Ortho. Defer LDS CT at this time.  - >5 min smoking cessation spent with patient - brochure given in clinic today  - she is going to call the smoking hotline to get some free nicotine gum and patches      3. ERA; diagnosed with sleep apnea last year. Has a CPAP device but unsure on how to use it.   - continue to follow with sleep      4. Allergic rhinitis: she has runny nose and post nasal drip   - continue fluticasone (flonase) 1 spray each nostril twice daily   - start cetrizine (Zyrtec) 10mg daily at bedtime     5. Eczema: bothersome symptoms. Will see if more of an asthma like picture could qualify for biologic   - upcoming appt with dermatology    6. Chest heaviness:   - referral to cardiology     Thank you for visiting the Pulmonary clinic today!   Return to clinic  3-6 months  or sooner if needed   Jessica Hale CNP  My office -  [phone]- Claudine is my  and Tiffanie is my nurse.   Radiology scheduling [phone]   Appointment scheduling [phone]   Pulmonary function testing - [phone]

## 2024-04-10 NOTE — PROGRESS NOTES
Patient: Cathy Curiel    [id number]  : 1955 -- AGE 68 y.o.    Provider: PAYAM Covington-CNP     Location [facility]   Service Date: 4/10/2024              [facility]  Follow up visit note      HISTORY OF PRESENT ILLNESS       HISTORY OF PRESENT ILLNESS   Mrs. Curiel is a 68 year old woman, current smoker (~0.5 PPD for 49 years, ~25 total pack years.) here for follow up for COPD & Asthma last seen in clinic on 10/25/23.     Since last visit she feels her breathing has continued to not do great. She has been using her breztri twice daily, albuterol HFA 6-7 x per day, and albuterol nebulizer every other night and maybe a few days a week.  She feels she needs her rescue more when she is smoking - depends on the day. She tried the nicotine patches and they were not helpful- helped for a day or two and then urge would be too much. She has not tried the patches, but has not tried patches with the gum/ lozenge. She quit for a week. She has been coughing - productive with clear mucous. She states her throat is dry at night. She needs a cord for her CPAP - they brought her supplies, but not a cord. She will notice wheezing - more so when she wakes up. She has VIRK with walking on level ground after a few minutes. She has some chest heaviness with exertion. She states it lingers for a few minutes and then resolves. She has VIRK with going up/down the stairs - uses her inhaler more. She has been trying to go up/down the stairs a few times a day as exercise. She states her chest heaviness resolves at rest -- before she uses her rescue. She states at the grocery store and [employer] she has the VIRK and chest heaviness. She will at times have SOB at rest. She has a dog at home - thinks she is allergic. She states the dog wakes her up when she stops breathing in her sleep. She feels the glands in her neck are swollen - going down, but it takes a while. She has some  pain in her right shoulder/ neck.  She has been using her flonase twice daily.  She has runny nose/ post nasal drip in the morning. She has GERD symptoms when she eats spicy food -- takes tums/ gasx 4-5 days a week. She is triggered by red sauce and spicy foods.  She is concerned for hepatitis. She has rash on her back, ear itching, and gas/ spasms.  She saw GI once at [facility] - was a virtual appt and felt they can't do a good interview over the phone. She states if she goes down to 180 her spasms stop in her stomach.  She is currently smoking 1 ppd.     10/25/23: She has been using her Breztri twice daily- and albuterol 5-6 x per day. She has been using her nebulizer 1-2 x per month.  She is currently not working and has been not as active. She has been not moving around as much because she needs to use her rescue more. She had her flu shot a week or so ago - feels like she is fighting a cold. She has some chest soreness form coughing so much. She quit smoking for 2 weeks. She states her cough is dry. She has wheezing at night when she lays down at night and with exertion. She has VIRK with going up/ down the stairs. She denies any SOB at rest. She denies sharp chest pains -- just sore from coughing.   --- She was started on loratadine as needed for a few weeks and did not notice an improvement. She has throbbing in her ears  - told it was clouded over from too many ear infections. She has been using ear drops intermittently that has been helpful. She states what she hears in the right ear is normal, but muffled.  She has itching in the ear canal. She has been using flonase daily at bedtime.  She has eczema - changed soaps, using cold water, and using an ointment. She states none of this has helped. She has been taking hydroxzyzine as needed, but she does not like them because they make her drowsy.  She has been having GERD with red sauce  - takes spoon of mustard and tums.    She was seen by Dr. Arboleda and has a CPAP.  She states she needs a cord for her CPAP machine -- states she talked to the sleep nurse and they ordered it.       2/2/23: Since last visit she has been using Breztri daily, albuterol 3 or 4 times with activities such as cleaning or grocery shopping, walking a distance. She is still smoking almost a pack a day. She coughs up yellow mucus first thing in the mornings. Wheezes and has chest tightness, albuterol helps this and the VIRK. She does have some nasal congestion/post nasal drip - she never got the loratadine we talked abou tlast visit. She sometimes wakes up at night and feels like she is choking, drinks some water a goes back to sleep. States she has not been sleeping well lately. She follows sleep and got a new CPAP machine that is not working for her. She is following up to get her settings changed. Has occasional GERD takes Tums or spoon full of mustard. No ED visits for respiratory issues. Up to date on vaccines - just needs next Covid booster. 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 She is still smoking, but is going to call the hotline today for assistance and is motivated to quit.      8/4/22: Since last visit she feels her breathing is about the same. She has VIRK with going up stairs and walking too far on level ground. She has been trying to walk to lose weight. She has had some chest discomfort related to weight and gas. She has been using her breztri twice daily. She uses her rescue 2-3 x per day. She has a nebulizer machine, but only needs treatments once every 2-3 weeks. She has been working on cutting down on smoking, but is still smoking a pack a day. She has a productive cough with clear sputum - more in the morning. She does have some runny nose and throat clearing. She notices some wheezing every once in a while - more so when she smokes to much. She denies any SOB at rest, or CP. She denies any issues with GERD, but has been having  "gas. She states this has been worse since she has gained weight. She feels it around her back under her breasts. She has been doing mustard and water or gas relief - she feels this helps. She feels the mustard helps with both gas as well as Galo horses. She had been following with GI at [facility] whom she had a disagreement with -- which is why she transferred care to . We discussed related to the significance of her gas pains it could be beneficial to establish with GI here. She has a CPAP machine and has had issues with setting it. She does notice some ankle edema if she is standing barbecuing for an extended period of time.   CAT today 19   ACT today 10      5/23/22: Patient presents to pulmonary clinic today for initially evaluation of COPD & Asthma. Was seen by her PCP on 4/4/22; of which was noted that PCP gave patient Breztri to try; and patient instructed to hold her Advair while trialing this medication. Was able to use the Breztri for 14 days before it ran out - felt like she was breathing a little better. Was diagnosed with Asthma and COPD 8 years ago per patient. Started smoking at age 17 - still smoking today. Currently smoking about 1ppd for the past few years, but on average she states about 6-8 cigarettes/day over time. She was able to quit smoking for about 1.5 years, but started back again. Has tried patches, Chantix (bad nightmares). Upon walking up her 12 stairs at her home - she gets very short of breath. Is able to walk on a flat surface without difficulty for about 15 minutes. Able to shower and get dressed w/o difficulty. Stopped working about 2 years ago - ever since then, breathing has gotten progressively worse. Using her Albuterol HFA typically twice a day everyday. If she is being more active - requires more.  -- She also denies orthopnea. Claims post nasal drip - generic OTC \"allergy relief\" medicine. Uses PRN - has not needed it this year yet. Denies lower leg edema. Her weight has " been mostly stable. She also relates chronic cough. Cough is productive. Sputum color is clear; no hemoptysis. Claims wheezing; every once in awhile. No night cough. No fever, shivering chills, +night sweats (claims hormonal). Very rare/occasional symptoms of heartburn - watches what she eats. She denies chest pain.  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 Never on oxygen. She has never been to pulmonary rehab.       ALLERGIES AND MEDICATIONS     ALLERGIES  Allergies   Allergen Reactions    Azithromycin Hives, Shortness of breath and Swelling     feet swell and blister    Meloxicam Hives and Unknown    Benzonatate Hives, Itching and Rash    Etodolac Other     HA    Iodinated Contrast Media Rash    Latex Rash    Penicillin Swelling    Penicillins Unknown       MEDICATIONS  Current Outpatient Medications   Medication Sig Dispense Refill    acetaminophen (Tylenol) 500 mg tablet Take 2 tablets (1,000 mg) by mouth if needed.      albuterol 2.5 mg /3 mL (0.083 %) nebulizer solution Take 3 mL (2.5 mg) by nebulization 4 times a day as needed for wheezing or shortness of breath. 540 mL 3    albuterol 90 mcg/actuation inhaler Inhale 1-2 puffs every 6 hours if needed for wheezing. Every 4 to 6 hours as needed 54 g 3    alcohol swabs (Alcohol Prep Pads) pads, medicated Apply 1 Pad topically once daily. 100 each 2    blood sugar diagnostic (OneTouch Ultra Test) strip TEST ONCE DAILY 100 strip 3    budesonide-glycopyr-formoterol (Breztri Aerosphere) 160-9-4.8 mcg/actuation HFA aerosol inhaler Inhale 2 puffs 2 times a day. 72 g 3    cyanocobalamin (Vitamin B-12) 1,000 mcg tablet Take 1 tablet (1,000 mcg) by mouth once daily.      fluticasone (Flonase) 50 mcg/actuation nasal spray Administer 1 spray into each nostril once daily. Shake gently. Before first use, prime pump. After use, clean tip and replace cap. 16 g 5     FreeStyle glucose monitoring (OneTouch Ultra2 Meter) kit 1 each once daily. 1 each 0    hydrOXYzine HCL (Atarax) 25 mg tablet Take 1 tablet (25 mg) by mouth 3 times a day as needed for itching. 270 tablet 0    lancets (Trip4realuch Delica Safety Lancet) 30 gauge misc 1 Lancet once daily. 100 each 3    losartan (Cozaar) 100 mg tablet Take 1 tablet (100 mg) by mouth once every day. 90 tablet 1    metFORMIN (Glucophage) 500 mg tablet Take 1 tablet (500 mg) by mouth once every day. 90 tablet 1    mv,rex,min/iron/folic acid/lut (COMPLETE MULTI ORAL) Take 1 capsule by mouth 1 (one) time each day.      rosuvastatin (Crestor) 40 mg tablet Take 1 tablet (40 mg) by mouth once every day. 90 tablet 1    triamcinolone (Kenalog) 0.1 % ointment Apply gently to the affected area three times daily to four times daily      ALPRAZolam (Xanax) 1 mg tablet Take 1 tablet (1 mg) by mouth 1 time for 1 dose. Take 1 hour prior to planned MRI. 2 tablet 0    predniSONE 10 mg tablets,dose pack Take as directed with food. Dispense #21 tab pack (Patient not taking: Reported on 4/10/2024) 1 each 0     No current facility-administered medications for this visit.         PAST HISTORY     PAST MEDICAL HISTORY  Current/Past Inhalers & Nebulized Medications:   - Advair  - Albuterol HFA PRN  - Albuterol Nebulizer PRN  - Spiriva Handihaler  - Symbicort (past)     Pulmonary/Significant Hospitalization History:  - 2 year hospitalization age 6 for TB     Sleep History:  - Has a CPAP machine; got it last year. Unsure on how to use it. Only tried it 2-3 times. Has been referred to Sleep by PCP.     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 Valentine with Ortho    PAST SURGICAL HISTORY  Past Surgical History:   Procedure Laterality Date    CT GUIDED PERCUTANEOUS BIOPSY BONE DEEP  8/29/2022    CT GUIDED PERCUTANEOUS BIOPSY BONE DEEP 8/29/2022  [facility]    OTHER SURGICAL HISTORY  04/04/2022    Shoulder arthroscopy       IMMUNIZATION HISTORY  Immunization History   Administered Date(s) Administered    Flu vaccine, quadrivalent, high-dose, preservative free, age 65y+ (FLUZONE) 10/11/2023    Influenza, seasonal, injectable 11/10/2021    Moderna COVID-19 vaccine, bivalent, blue cap/gray label *Check age/dose* 04/05/2023    Moderna SARS-CoV-2 Vaccination 03/26/2021, 04/23/2021, 12/20/2021    Pneumococcal polysaccharide vaccine, 23-valent, age 2 years and older (PNEUMOVAX 23) 10/14/2014    Tdap vaccine, age 7 year and older (BOOSTRIX, ADACEL) 06/08/2021       SOCIAL HISTORY  Smoking: current smoker (~0.5 PPD for 49 years, ~25 total pack years.)  Vaping: none  Alcohol Use: none  Illicit Drug Use: Occasional marijuana use (blunt); once every couple months for sleep aid.     OCCUPATIONAL/ENVIRONMENTAL HISTORY  Previously worked as: Factory work, fast food.  Currently works as: Unemployed  No known exposure to asbestos, silica, beryllium or inhaled metals.  No exposure to birds or exotic animals.    FAMILY HISTORY  Family History   Problem Relation Name Age of Onset    Uterine cancer Mother      Hypertension Mother          BENIGN    Other (CVA) Father      Hypertension Father      Throat cancer Brother       - +Fam Hx of Sarcoidosis  - Strong family hx of Sarcoidosis; 7 out of 9 first cousins have it.  - Mother; uterine CA  - Brother; throat CA    RESULTS/DATA     Pulmonary Function Test Results       PFTs:   - 8/4/22: FEV1/FVC 0.55/ FEV1 1.67 (82%)/ FVC 3.01 (116%)/ TLC 5.11 (115%)/ DLCO 62% -- FEF 25/75 27%   - 2/8/24 - FEV1/FVC 0.51/ FEV1 1.56 (69% - almost BD resp)/ FVC 2.95 (102%)     FENO: 1/9/24 - 8ppb      6 MWTs: 8/4/22 - 404m (,) 97 -> 95% on RA, DARCIE 0     Chest Radiograph     XR chest 2 views 01/13/2022  IMPRESSION:  No acute cardiopulmonary abnormality identified.    MACRO: None      Chest CT Scan     - 6/24/22 1  -Few small lung nodules  measuring up to 5 mm as above, likely benign. Continued annual screening with low-dose noncontrast chest CT is recommended. Mild emphysema. Prior granulomatous exposure. Borderline cardiomegaly. Moderate coronary artery calcifications, indicating the presence of coronary artery disease.   - 9/30/22 - Stable pulmonary nodules. Fatty infiltration of the liver. No features to suggest metastatic disease or disease progression  - 2/28/23 - No evidence of metastatic disease to the chest. Few stable lung  nodules measuring up to 5 mm.  2. Moderate coronary artery calcifications. Please note that, the  present study is not tailored for evaluation of coronary arteries.  3. Similar asymmetric enlargement and heterogeneous appearance of the  right lobe of thyroid gland. Consider thyroid ultrasound for further  characterization, if clinically warranted.  - 9/25/23 - 1. Stable pulmonary nodules measuring up to 5 mm in the left upper  lobe, likely benign. 2. Mild upper lung predominant emphysema. 3. Moderate coronary artery calcifications. 4. Additional findings as above  - 3/25/24 - Stable benign-appearing pulmonary nodules measuring up to 0.5 cm  in the left upper lobe. No new pulmonary nodules.  2. Mild upper lung predominant emphysematous change.  3. Additional findings as detailed above.        Echocardiogram     Echocardiogram: 6/8/22 - EF 65%, Diastolic dysfunction. RA normal size, RV normal size and function       Other testing      CBC  - 6/30/22 - Eos 160   - 8/8/23 - Eos 130     RAST:   1/9/24 - IgE 363 - multiple positive allergies      -------------      OS      CT Chest (From [facility] - Impression Only)   -6/23/21: IMPRESSION: Centrilobular emphysema without new or enlarging pulmonary nodule. Stable scattered 2 mm nodules and intrapulmonary lymph node in the left upper lobe. Lung-RADS Category 2 (Benign appearance or behavior). Continued annual screening in 12 months with a CT CHEST LOW DOSE LUNG CANCER  SCREENING is recommended.        REVIEW OF SYSTEMS     REVIEW OF SYSTEMS  Review of Systems   Constitutional:  Negative for fatigue and fever.   HENT:  Negative for congestion, postnasal drip, rhinorrhea, sinus pressure and voice change.    Eyes:  Negative for pain and visual disturbance.   Cardiovascular:  Negative for chest pain, palpitations and leg swelling.   Gastrointestinal:  Negative for abdominal pain, diarrhea, nausea and vomiting.   Endocrine: Negative for cold intolerance and heat intolerance.   Musculoskeletal:  Negative for arthralgias, back pain, joint swelling and myalgias.   Skin:  Negative for rash.   Neurological:  Negative for dizziness, weakness, numbness and headaches.   Psychiatric/Behavioral:  Negative for agitation. The patient is not nervous/anxious.          PHYSICAL EXAM     VITAL SIGNS: /70 (BP Location: Left arm, Patient Position: Sitting)   Pulse 83   Temp 36 °C (96.8 °F)   Resp 18   Wt 89.7 kg (197 lb 12 oz)   SpO2 100%   BMI 35.03 kg/m²      CURRENT WEIGHT: [unfilled]  BMI: [unfilled]  PREVIOUS WEIGHTS:  Wt Readings from Last 3 Encounters:   04/10/24 89.7 kg (197 lb 12 oz)   01/09/24 86.6 kg (191 lb)   10/25/23 88.6 kg (195 lb 5.2 oz)       Physical Exam  Vitals reviewed.   Constitutional:       General: She is not in acute distress.     Appearance: Normal appearance. She is not ill-appearing or toxic-appearing.   HENT:      Head: Normocephalic.      Nose: No rhinorrhea.   Cardiovascular:      Rate and Rhythm: Normal rate and regular rhythm.      Heart sounds: Normal heart sounds.   Pulmonary:      Effort: Pulmonary effort is normal. No respiratory distress.      Breath sounds: Normal breath sounds. No stridor. No wheezing, rhonchi or rales.   Abdominal:      General: Abdomen is flat.   Musculoskeletal:         General: Normal range of motion.      Right lower leg: No edema.      Left lower leg: No edema.   Skin:     General: Skin is warm and dry.      Nails: There is no  clubbing.   Neurological:      General: No focal deficit present.      Mental Status: She is alert and oriented to person, place, and time.   Psychiatric:         Mood and Affect: Mood normal.         Behavior: Behavior normal.         Judgment: Judgment normal.       ASSESSMENT/PLAN     1. COPD/VIRK: Patient states she was diagnosed with both COPD and Asthma about 8 years ago.  Hx of TB with hospitalization as a child. PFTs with mild obstruction. Echo with normal EF - impaired relaxation.   - continue Breztri; 2 puffs twice a day -> wash out mouth after use.   - continue albuterol HFA inhaler 2 puffs or albuterol nebulizer treatment every 4-6 hours as needed for shortness of breath   - discussed pulmonary rehab down the line - plan to increase activity at home   - discussed increasing activity as tolerated   - will start paperwork for dupixent - asthma and eczema - pt concerned about cost. Will send loading dose to confirm price.       2. Nicotine Dependence; current active smoker. Shared decisionmaking discussion completed with patient for lung cancer screening. She has tried pills (chantix)/patches and it did not work. Resection of sarcoma, currently getting surveillance screening with Ortho. Defer LDS CT at this time.  - >5 min smoking cessation spent with patient - brochure given in clinic last visit   - she is going to call the smoking hotline to get some free nicotine gum and patches   - due for LCS if no other CT is done 3/2025      3. ERA; diagnosed with sleep apnea last year. Has a CPAP device but unsure on how to use it.   - continue to follow with sleep      4. Allergic rhinitis: she has runny nose and post nasal drip   - continue fluticasone (flonase) 1 spray each nostril twice daily   - start cetrizine (Zyrtec) 10mg daily at bedtime     5. Eczema: bothersome symptoms. Will see if more of an asthma like picture could qualify for biologic   - upcoming appt with dermatology    6. Chest heaviness:   -  referral to cardiology     Thank you for visiting the Pulmonary clinic today!   Return to clinic  3-6 months  or sooner if needed   Jessica Hale CNP  My office -  [phone]- Claudine is my  and Tiffanie is my nurse.   Radiology scheduling [phone]   Appointment scheduling [phone]   Pulmonary function testing - [phone]

## 2024-04-11 ENCOUNTER — SPECIALTY PHARMACY (OUTPATIENT)
Dept: PHARMACY | Facility: CLINIC | Age: 69
End: 2024-04-11

## 2024-04-11 NOTE — PROGRESS NOTES
Office called to make certain Dupixent script received. It was. Office will have patient call to discuss cost and delivery option/s.

## 2024-04-15 ENCOUNTER — TELEPHONE (OUTPATIENT)
Dept: PULMONOLOGY | Facility: HOSPITAL | Age: 69
End: 2024-04-15
Payer: COMMERCIAL

## 2024-04-15 DIAGNOSIS — J45.52 SEVERE PERSISTENT ASTHMA WITH STATUS ASTHMATICUS (MULTI): Primary | ICD-10-CM

## 2024-04-15 RX ORDER — PREDNISONE 10 MG/1
TABLET ORAL
Qty: 30 TABLET | Refills: 0 | Status: SHIPPED | OUTPATIENT
Start: 2024-04-15 | End: 2024-05-15

## 2024-04-15 RX ORDER — AMOXICILLIN AND CLAVULANATE POTASSIUM 875; 125 MG/1; MG/1
1 TABLET, FILM COATED ORAL 2 TIMES DAILY
Qty: 20 TABLET | Refills: 0 | Status: CANCELLED | OUTPATIENT
Start: 2024-04-15 | End: 2024-04-25

## 2024-04-15 RX ORDER — DOXYCYCLINE 100 MG/1
100 CAPSULE ORAL 2 TIMES DAILY
Qty: 20 CAPSULE | Refills: 0 | Status: SHIPPED | OUTPATIENT
Start: 2024-04-15 | End: 2024-04-25

## 2024-04-15 NOTE — TELEPHONE ENCOUNTER
Sent the following message to Sha Garcia CNP:  This is a Jessica patient that she treats for severe asthma. The patient is complaining of a hard productive cough with brown sputum, chest soreness, wheezing, VIRK, and chills. Patient denies fever. She is requesting an antibiotic and prednisone. It has been over a year since she was prescribed these medications. She is allergic to Zithromax.   Informed the patient that per Sha, prescriptions for Augmentin and a prednisone taper have been sent to her pharmacy.  Patient expressed understanding and appreciation for the call.

## 2024-04-23 ENCOUNTER — OFFICE VISIT (OUTPATIENT)
Dept: ORTHOPEDIC SURGERY | Facility: HOSPITAL | Age: 69
End: 2024-04-23
Payer: COMMERCIAL

## 2024-04-23 VITALS — HEIGHT: 63 IN | WEIGHT: 197 LBS | BODY MASS INDEX: 34.91 KG/M2

## 2024-04-23 DIAGNOSIS — C49.9 SPINDLE CELL SARCOMA (MULTI): Primary | ICD-10-CM

## 2024-04-23 PROCEDURE — 99214 OFFICE O/P EST MOD 30 MIN: CPT | Performed by: STUDENT IN AN ORGANIZED HEALTH CARE EDUCATION/TRAINING PROGRAM

## 2024-04-23 PROCEDURE — 4010F ACE/ARB THERAPY RXD/TAKEN: CPT | Performed by: STUDENT IN AN ORGANIZED HEALTH CARE EDUCATION/TRAINING PROGRAM

## 2024-04-23 PROCEDURE — 1159F MED LIST DOCD IN RCRD: CPT | Performed by: STUDENT IN AN ORGANIZED HEALTH CARE EDUCATION/TRAINING PROGRAM

## 2024-04-23 PROCEDURE — G2211 COMPLEX E/M VISIT ADD ON: HCPCS | Performed by: STUDENT IN AN ORGANIZED HEALTH CARE EDUCATION/TRAINING PROGRAM

## 2024-04-23 PROCEDURE — 1160F RVW MEDS BY RX/DR IN RCRD: CPT | Performed by: STUDENT IN AN ORGANIZED HEALTH CARE EDUCATION/TRAINING PROGRAM

## 2024-04-23 PROCEDURE — 3044F HG A1C LEVEL LT 7.0%: CPT | Performed by: STUDENT IN AN ORGANIZED HEALTH CARE EDUCATION/TRAINING PROGRAM

## 2024-04-23 RX ORDER — ALPRAZOLAM 1 MG/1
1 TABLET ORAL ONCE
Qty: 2 TABLET | Refills: 0 | Status: SHIPPED | OUTPATIENT
Start: 2024-04-23 | End: 2024-04-23

## 2024-04-23 ASSESSMENT — PAIN - FUNCTIONAL ASSESSMENT: PAIN_FUNCTIONAL_ASSESSMENT: NO/DENIES PAIN

## 2024-04-23 ASSESSMENT — ENCOUNTER SYMPTOMS
LOSS OF SENSATION IN FEET: 0
DEPRESSION: 0
OCCASIONAL FEELINGS OF UNSTEADINESS: 0

## 2024-04-23 NOTE — PROGRESS NOTES
Orthopaedic Surgery Clinic Progress Note:    CC: Follow-up surveillance imaging    S: 68 y.o. 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  She is here for routine surveillance scans.  Denies any new growths or masses.  No new numbness or tingling.  Denies any new trauma.    Objective:    Exam:  Gen: alert, appropriately conversational, no apparent distress  Chest: symmetric chest rise, non-labored breathing  Heart: RRR to peripheral palpation    Physical exam of the left upper extremity shows no recurrences or masses. He has full elbow and shoulder range of motion. Sensation is intact light touch in all distributions. He has palpable pulses and brisk cap refill distally. AIN, PIN, ulnar nerves are intact.     Imaging:  MRI of the left upper extremity with without contrast was obtained and individually reviewed by myself.  This shows no evidence of nodular enhancing areas to suggest local recurrence.  CT scan of the chest shows no obvious areas of metastatic disease but does redemonstrate stable pulmonary nodules.     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  Continue to watch this on a 6-month interval for the time being.  Eventual be able to spread this out to annual visits.  For the time being we will repeat CT scan of chest as well as MRI of her left humerus in 6 months with follow-up visit to go over the results afterwards.

## 2024-04-30 ENCOUNTER — APPOINTMENT (OUTPATIENT)
Dept: ORTHOPEDIC SURGERY | Facility: HOSPITAL | Age: 69
End: 2024-04-30
Payer: COMMERCIAL

## 2024-05-14 ENCOUNTER — TELEPHONE (OUTPATIENT)
Dept: PULMONOLOGY | Facility: HOSPITAL | Age: 69
End: 2024-05-14

## 2024-05-14 ENCOUNTER — APPOINTMENT (OUTPATIENT)
Dept: PRIMARY CARE | Facility: CLINIC | Age: 69
End: 2024-05-14
Payer: COMMERCIAL

## 2024-05-14 ENCOUNTER — OFFICE VISIT (OUTPATIENT)
Dept: PRIMARY CARE | Facility: CLINIC | Age: 69
End: 2024-05-14
Payer: COMMERCIAL

## 2024-05-14 ENCOUNTER — LAB (OUTPATIENT)
Dept: LAB | Facility: LAB | Age: 69
End: 2024-05-14
Payer: COMMERCIAL

## 2024-05-14 VITALS
SYSTOLIC BLOOD PRESSURE: 160 MMHG | HEIGHT: 63 IN | DIASTOLIC BLOOD PRESSURE: 75 MMHG | BODY MASS INDEX: 34.73 KG/M2 | WEIGHT: 196 LBS | RESPIRATION RATE: 18 BRPM | HEART RATE: 78 BPM

## 2024-05-14 DIAGNOSIS — R10.11 RIGHT UPPER QUADRANT ABDOMINAL PAIN: ICD-10-CM

## 2024-05-14 DIAGNOSIS — I10 PRIMARY HYPERTENSION: ICD-10-CM

## 2024-05-14 DIAGNOSIS — Z12.31 ENCOUNTER FOR SCREENING MAMMOGRAM FOR MALIGNANT NEOPLASM OF BREAST: Primary | ICD-10-CM

## 2024-05-14 DIAGNOSIS — Z12.11 SCREENING FOR COLON CANCER: ICD-10-CM

## 2024-05-14 DIAGNOSIS — E78.5 HYPERLIPIDEMIA, UNSPECIFIED HYPERLIPIDEMIA TYPE: ICD-10-CM

## 2024-05-14 DIAGNOSIS — L30.9: ICD-10-CM

## 2024-05-14 DIAGNOSIS — E11.9 CONTROLLED TYPE 2 DIABETES MELLITUS WITHOUT COMPLICATION, WITHOUT LONG-TERM CURRENT USE OF INSULIN (MULTI): ICD-10-CM

## 2024-05-14 LAB
ALBUMIN SERPL BCP-MCNC: 4.3 G/DL (ref 3.4–5)
ALP SERPL-CCNC: 60 U/L (ref 33–136)
ALT SERPL W P-5'-P-CCNC: 16 U/L (ref 7–45)
ANION GAP SERPL CALC-SCNC: 14 MMOL/L (ref 10–20)
AST SERPL W P-5'-P-CCNC: 14 U/L (ref 9–39)
BASOPHILS # BLD AUTO: 0.05 X10*3/UL (ref 0–0.1)
BASOPHILS NFR BLD AUTO: 0.5 %
BILIRUB SERPL-MCNC: 0.3 MG/DL (ref 0–1.2)
BUN SERPL-MCNC: 20 MG/DL (ref 6–23)
CALCIUM SERPL-MCNC: 9.7 MG/DL (ref 8.6–10.6)
CHLORIDE SERPL-SCNC: 105 MMOL/L (ref 98–107)
CHOLEST SERPL-MCNC: 107 MG/DL (ref 0–199)
CHOLESTEROL/HDL RATIO: 1.9
CO2 SERPL-SCNC: 26 MMOL/L (ref 21–32)
CREAT SERPL-MCNC: 0.98 MG/DL (ref 0.5–1.05)
EGFRCR SERPLBLD CKD-EPI 2021: 63 ML/MIN/1.73M*2
EOSINOPHIL # BLD AUTO: 0.12 X10*3/UL (ref 0–0.7)
EOSINOPHIL NFR BLD AUTO: 1.3 %
ERYTHROCYTE [DISTWIDTH] IN BLOOD BY AUTOMATED COUNT: 14.6 % (ref 11.5–14.5)
GLUCOSE SERPL-MCNC: 137 MG/DL (ref 74–99)
HCT VFR BLD AUTO: 45 % (ref 36–46)
HDLC SERPL-MCNC: 55.8 MG/DL
HGB BLD-MCNC: 13.7 G/DL (ref 12–16)
IMM GRANULOCYTES # BLD AUTO: 0.05 X10*3/UL (ref 0–0.7)
IMM GRANULOCYTES NFR BLD AUTO: 0.5 % (ref 0–0.9)
LDLC SERPL DIRECT ASSAY-MCNC: 38 MG/DL (ref 0–129)
LIPASE SERPL-CCNC: 19 U/L (ref 9–82)
LYMPHOCYTES # BLD AUTO: 2.26 X10*3/UL (ref 1.2–4.8)
LYMPHOCYTES NFR BLD AUTO: 24.7 %
MCH RBC QN AUTO: 28.4 PG (ref 26–34)
MCHC RBC AUTO-ENTMCNC: 30.4 G/DL (ref 32–36)
MCV RBC AUTO: 93 FL (ref 80–100)
MONOCYTES # BLD AUTO: 0.83 X10*3/UL (ref 0.1–1)
MONOCYTES NFR BLD AUTO: 9.1 %
NEUTROPHILS # BLD AUTO: 5.83 X10*3/UL (ref 1.2–7.7)
NEUTROPHILS NFR BLD AUTO: 63.9 %
NON-HDL CHOLESTEROL: 51 MG/DL (ref 0–149)
NRBC BLD-RTO: 0 /100 WBCS (ref 0–0)
PLATELET # BLD AUTO: 334 X10*3/UL (ref 150–450)
POTASSIUM SERPL-SCNC: 4.2 MMOL/L (ref 3.5–5.3)
PROT SERPL-MCNC: 7.1 G/DL (ref 6.4–8.2)
RBC # BLD AUTO: 4.83 X10*6/UL (ref 4–5.2)
SODIUM SERPL-SCNC: 141 MMOL/L (ref 136–145)
TSH SERPL-ACNC: 1.35 MIU/L (ref 0.44–3.98)
WBC # BLD AUTO: 9.1 X10*3/UL (ref 4.4–11.3)

## 2024-05-14 PROCEDURE — 99397 PER PM REEVAL EST PAT 65+ YR: CPT | Performed by: INTERNAL MEDICINE

## 2024-05-14 PROCEDURE — 85025 COMPLETE CBC W/AUTO DIFF WBC: CPT

## 2024-05-14 PROCEDURE — 3077F SYST BP >= 140 MM HG: CPT | Performed by: INTERNAL MEDICINE

## 2024-05-14 PROCEDURE — 1170F FXNL STATUS ASSESSED: CPT | Performed by: INTERNAL MEDICINE

## 2024-05-14 PROCEDURE — 1159F MED LIST DOCD IN RCRD: CPT | Performed by: INTERNAL MEDICINE

## 2024-05-14 PROCEDURE — 3078F DIAST BP <80 MM HG: CPT | Performed by: INTERNAL MEDICINE

## 2024-05-14 PROCEDURE — 3044F HG A1C LEVEL LT 7.0%: CPT | Performed by: INTERNAL MEDICINE

## 2024-05-14 PROCEDURE — 80053 COMPREHEN METABOLIC PANEL: CPT

## 2024-05-14 PROCEDURE — 83718 ASSAY OF LIPOPROTEIN: CPT

## 2024-05-14 PROCEDURE — 83721 ASSAY OF BLOOD LIPOPROTEIN: CPT

## 2024-05-14 PROCEDURE — 84443 ASSAY THYROID STIM HORMONE: CPT

## 2024-05-14 PROCEDURE — 99214 OFFICE O/P EST MOD 30 MIN: CPT | Performed by: INTERNAL MEDICINE

## 2024-05-14 PROCEDURE — 36415 COLL VENOUS BLD VENIPUNCTURE: CPT

## 2024-05-14 PROCEDURE — 1160F RVW MEDS BY RX/DR IN RCRD: CPT | Performed by: INTERNAL MEDICINE

## 2024-05-14 PROCEDURE — 1124F ACP DISCUSS-NO DSCNMKR DOCD: CPT | Performed by: INTERNAL MEDICINE

## 2024-05-14 PROCEDURE — 83690 ASSAY OF LIPASE: CPT

## 2024-05-14 PROCEDURE — 82465 ASSAY BLD/SERUM CHOLESTEROL: CPT

## 2024-05-14 RX ORDER — TRIAMCINOLONE ACETONIDE 1 MG/G
OINTMENT TOPICAL 2 TIMES DAILY
Qty: 80 G | Refills: 2 | Status: SHIPPED | OUTPATIENT
Start: 2024-05-14

## 2024-05-14 RX ORDER — METFORMIN HYDROCHLORIDE 500 MG/1
500 TABLET ORAL
Qty: 90 TABLET | Refills: 1 | Status: SHIPPED | OUTPATIENT
Start: 2024-05-14

## 2024-05-14 RX ORDER — BLOOD-GLUCOSE CONTROL, NORMAL
1 EACH MISCELLANEOUS DAILY
Qty: 100 EACH | Refills: 3 | Status: SHIPPED | OUTPATIENT
Start: 2024-05-14

## 2024-05-14 RX ORDER — ROSUVASTATIN CALCIUM 40 MG/1
40 TABLET, COATED ORAL
Qty: 90 TABLET | Refills: 1 | Status: SHIPPED | OUTPATIENT
Start: 2024-05-14

## 2024-05-14 RX ORDER — ISOPROPYL ALCOHOL 70 ML/100ML
1 SWAB TOPICAL DAILY
Qty: 100 EACH | Refills: 2 | Status: SHIPPED | OUTPATIENT
Start: 2024-05-14

## 2024-05-14 RX ORDER — LOSARTAN POTASSIUM 100 MG/1
100 TABLET ORAL
Qty: 90 TABLET | Refills: 1 | Status: CANCELLED | OUTPATIENT
Start: 2024-05-14

## 2024-05-14 RX ORDER — LOSARTAN POTASSIUM AND HYDROCHLOROTHIAZIDE 25; 100 MG/1; MG/1
1 TABLET ORAL DAILY
Qty: 90 TABLET | Refills: 1 | Status: SHIPPED | OUTPATIENT
Start: 2024-05-14 | End: 2024-11-10

## 2024-05-14 ASSESSMENT — ENCOUNTER SYMPTOMS
CONSTIPATION: 0
JOINT SWELLING: 0
DIAPHORESIS: 0
DEPRESSION: 0
ARTHRALGIAS: 0
HEMATURIA: 0
VOMITING: 0
FEVER: 0
DYSURIA: 0
SHORTNESS OF BREATH: 0
SORE THROAT: 0
BACK PAIN: 0
NECK PAIN: 0
CHILLS: 0
PALPITATIONS: 0
WEAKNESS: 0
LOSS OF SENSATION IN FEET: 0
OCCASIONAL FEELINGS OF UNSTEADINESS: 0
APPETITE CHANGE: 0
MYALGIAS: 0
WHEEZING: 0
DIZZINESS: 0
ABDOMINAL PAIN: 1
BLOOD IN STOOL: 0
NUMBNESS: 0
NECK STIFFNESS: 0
SINUS PRESSURE: 0
FREQUENCY: 0
ABDOMINAL DISTENTION: 0
LIGHT-HEADEDNESS: 0
COUGH: 0
HEADACHES: 0
DIFFICULTY URINATING: 0
DIARRHEA: 0
RHINORRHEA: 0
FATIGUE: 0
NAUSEA: 0

## 2024-05-14 ASSESSMENT — ACTIVITIES OF DAILY LIVING (ADL)
DRESSING: INDEPENDENT
DOING_HOUSEWORK: INDEPENDENT
GROCERY_SHOPPING: INDEPENDENT
BATHING: INDEPENDENT
TAKING_MEDICATION: INDEPENDENT
MANAGING_FINANCES: INDEPENDENT

## 2024-05-14 ASSESSMENT — PATIENT HEALTH QUESTIONNAIRE - PHQ9
1. LITTLE INTEREST OR PLEASURE IN DOING THINGS: NOT AT ALL
SUM OF ALL RESPONSES TO PHQ9 QUESTIONS 1 AND 2: 0
2. FEELING DOWN, DEPRESSED OR HOPELESS: NOT AT ALL

## 2024-05-14 NOTE — TELEPHONE ENCOUNTER
Patient explained that she ran out of Breztri refills.  This nurse contacted [facility] and provided a verbal Breztri prescription with refills. Patient informed and expressed appreciation.

## 2024-05-14 NOTE — PROGRESS NOTES
"Subjective   Patient ID: Cathy Curiel is a 68 y.o. female who presents for Medicare Annual Wellness Visit Subsequent (Abdominal pain /).    HPI   Presents to [facility] care with complaint of right-sided abdominal pain, intermittent sometimes waking her from sleep.  It feels crampy in origin she feels it radiates into the rest of her abdomen and into her back.  It is not associated with nausea, vomiting, diarrhea, constipation.  She denies hematemesis blood per rectum.  She has had this pain intermittently for 3 to 4 months    Review of Systems   Constitutional:  Negative for appetite change, chills, diaphoresis, fatigue and fever.   HENT:  Negative for congestion, ear discharge, ear pain, hearing loss, postnasal drip, rhinorrhea, sinus pressure, sore throat and tinnitus.    Eyes:  Negative for visual disturbance.   Respiratory:  Negative for cough, shortness of breath and wheezing.    Cardiovascular:  Negative for chest pain, palpitations and leg swelling.   Gastrointestinal:  Positive for abdominal pain. Negative for abdominal distention, blood in stool, constipation, diarrhea, nausea and vomiting.   Genitourinary:  Negative for decreased urine volume, difficulty urinating, dysuria, frequency, hematuria and urgency.   Musculoskeletal:  Negative for arthralgias, back pain, gait problem, joint swelling, myalgias, neck pain and neck stiffness.   Skin:  Negative for rash.   Neurological:  Negative for dizziness, weakness, light-headedness, numbness and headaches.         Objective   /75   Pulse 78   Resp 18   Ht 1.6 m (5' 3\")   Wt 88.9 kg (196 lb)   BMI 34.72 kg/m²     Physical Exam  Vitals reviewed.   Constitutional:       Appearance: Normal appearance. She is normal weight.   HENT:      Right Ear: Tympanic membrane and external ear normal.      Left Ear: Tympanic membrane and external ear normal.   Eyes:      Extraocular Movements: Extraocular movements intact.      Conjunctiva/sclera: Conjunctivae " normal.      Pupils: Pupils are equal, round, and reactive to light.   Cardiovascular:      Rate and Rhythm: Normal rate and regular rhythm.      Pulses: Normal pulses.   Pulmonary:      Effort: Pulmonary effort is normal.      Breath sounds: Normal breath sounds.   Abdominal:      General: Bowel sounds are normal.      Palpations: Abdomen is soft.      Tenderness: There is abdominal tenderness.      Comments: Abdomen obese. Mild tenderness right upper and middle quadrant.   Musculoskeletal:         General: Normal range of motion.      Cervical back: Normal range of motion.   Skin:     General: Skin is warm and dry.   Neurological:      General: No focal deficit present.      Mental Status: She is oriented to person, place, and time.   Psychiatric:         Mood and Affect: Mood normal.         Behavior: Behavior normal.         Assessment/Plan   Problem List Items Addressed This Visit             ICD-10-CM    Controlled type 2 diabetes mellitus without complication, without long-term current use of insulin (Multi) E11.9    Relevant Medications    metFORMIN (Glucophage) 500 mg tablet    lancets (Onetouch Delica Safety Lancet) 30 gauge misc    alcohol swabs (Alcohol Prep Pads) pads, medicated    Hyperlipidemia E78.5    Relevant Medications    rosuvastatin (Crestor) 40 mg tablet    Other Relevant Orders    Lipid Panel Non-Fasting    Cholesterol, LDL Direct    Hypertension I10    Relevant Medications    losartan-hydrochlorothiazide (Hyzaar) 100-25 mg tablet    Other Relevant Orders    TSH with reflex to Free T4 if abnormal    Erythematous eczema L30.9    Relevant Medications    triamcinolone (Kenalog) 0.1 % ointment    Right upper quadrant abdominal pain R10.11    Relevant Orders    Lipase    CBC and Auto Differential    Comprehensive Metabolic Panel    CT abdomen wo IV contrast    Screening for colon cancer Z12.11    Relevant Orders    Colonoscopy Screening; Average Risk Patient    Encounter for screening mammogram  for malignant neoplasm of breast - Primary Z12.31    Relevant Orders    BI mammo bilateral screening tomosynthesis   RTC in 3 mo

## 2024-05-22 ENCOUNTER — APPOINTMENT (OUTPATIENT)
Dept: RADIOLOGY | Facility: HOSPITAL | Age: 69
End: 2024-05-22
Payer: COMMERCIAL

## 2024-06-07 ENCOUNTER — APPOINTMENT (OUTPATIENT)
Dept: RADIOLOGY | Facility: HOSPITAL | Age: 69
End: 2024-06-07
Payer: COMMERCIAL

## 2024-06-12 ENCOUNTER — APPOINTMENT (OUTPATIENT)
Dept: DERMATOLOGY | Facility: CLINIC | Age: 69
End: 2024-06-12
Payer: COMMERCIAL

## 2024-06-12 DIAGNOSIS — L20.89 OTHER ATOPIC DERMATITIS: Primary | ICD-10-CM

## 2024-06-12 DIAGNOSIS — F17.218 CIGARETTE NICOTINE DEPENDENCE WITH OTHER NICOTINE-INDUCED DISORDER: ICD-10-CM

## 2024-06-12 DIAGNOSIS — L29.9 ITCHING: ICD-10-CM

## 2024-06-12 PROCEDURE — 1159F MED LIST DOCD IN RCRD: CPT | Performed by: STUDENT IN AN ORGANIZED HEALTH CARE EDUCATION/TRAINING PROGRAM

## 2024-06-12 PROCEDURE — 99204 OFFICE O/P NEW MOD 45 MIN: CPT | Performed by: STUDENT IN AN ORGANIZED HEALTH CARE EDUCATION/TRAINING PROGRAM

## 2024-06-12 PROCEDURE — G2211 COMPLEX E/M VISIT ADD ON: HCPCS | Performed by: STUDENT IN AN ORGANIZED HEALTH CARE EDUCATION/TRAINING PROGRAM

## 2024-06-12 PROCEDURE — 99406 BEHAV CHNG SMOKING 3-10 MIN: CPT | Performed by: STUDENT IN AN ORGANIZED HEALTH CARE EDUCATION/TRAINING PROGRAM

## 2024-06-12 PROCEDURE — 3044F HG A1C LEVEL LT 7.0%: CPT | Performed by: STUDENT IN AN ORGANIZED HEALTH CARE EDUCATION/TRAINING PROGRAM

## 2024-06-12 PROCEDURE — 3048F LDL-C <100 MG/DL: CPT | Performed by: STUDENT IN AN ORGANIZED HEALTH CARE EDUCATION/TRAINING PROGRAM

## 2024-06-12 RX ORDER — BETAMETHASONE VALERATE 1.2 MG/G
OINTMENT TOPICAL 2 TIMES DAILY
Qty: 45 G | Refills: 5 | Status: SHIPPED | OUTPATIENT
Start: 2024-06-12

## 2024-06-12 RX ORDER — HYDROXYZINE PAMOATE 25 MG/1
25 CAPSULE ORAL 3 TIMES DAILY PRN
Qty: 270 CAPSULE | Refills: 0 | Status: SHIPPED | OUTPATIENT
Start: 2024-06-12 | End: 2024-09-10

## 2024-06-12 NOTE — PROGRESS NOTES
Subjective     Cathy Curiel is a 68 y.o. female who presents for the following: Eczema (Patient was diagnosed with eczema years ago and used Triamcinolone; however, recently her symptoms have worsened and spread to entire body. Today patient states she is flaring everywhere. ).     Review of Systems:  No other skin or systemic complaints other than what is documented elsewhere in the note.    The following portions of the chart were reviewed this encounter and updated as appropriate:          Skin Cancer History  No skin cancer on file.      Specialty Problems          Dermatology Problems    Erythematous eczema    Tinea corporis    Itching        Objective   Well appearing patient in no apparent distress; mood and affect are within normal limits.    A focused skin examination was performed. All findings within normal limits unless otherwise noted below.    Assessment/Plan   1. Other atopic dermatitis  Erythematous scaly papules and plaques with overyling excoriation located symmetrically in the antecubital and popliteal fossae.     The chronic and intermittently flaring nature of this skin condition was discussed. Advised that this occurs due to a genetic defect in the skin barrier resulting in increased loss of water from skin resulting in dry, itchy, inflammed skin. Atopic dermatitis treatment goal is to restore the skin barrier.    We discussed a gentle skin care regimen including washing with a mild soap without fragrance such as dove for sensitive skin, cetaphil or cerave. Pat dry after washing and then apply a thick moisturizer such as vaseline, Aquaphor, Cerave cream or Cetaphil cream.  Reapplication multiple times per day may be necessary.   Patient failed topical triamcinolone.  When the skin has flared patient to apply betamethasone 0.1% ointment 2x daily x 2 weeks, then one week off, and repeat as needed. Side effects of topical steroids were reviewed including risk of skin atrophy  Atarax prn at  night for itching, can use up to 3 times daily. Advised this can cause excessive drowsiness.  Follow in in 6 months for continued management of this complex chronic condition      betamethasone valerate (Valisone) 0.1 % ointment  Apply topically 2 times a day. To raised rough skin until smooth    Related Procedures  Follow Up In Dermatology - Established Patient    Related Medications  hydrOXYzine pamoate (Vistaril) 25 mg capsule  Take 1 capsule (25 mg) by mouth 3 times a day as needed for itching.    2. Itching    3. Cigarette nicotine dependence with other nicotine-induced disorder    Reviewed that quitting smoking is best thing patient can do for her health  Reviewed barriers to quitting  Discussed multiple modalities to help quit  Patient is interested in the nicotine patch and nicotine lozenges  Offered to prescribe but she says she can get for free through a quitting program  Encouraged her to obtain these as she seems very motivated today to quit  Time spent counselin minutes

## 2024-06-19 ENCOUNTER — APPOINTMENT (OUTPATIENT)
Dept: RADIOLOGY | Facility: HOSPITAL | Age: 69
End: 2024-06-19
Payer: COMMERCIAL

## 2024-07-09 ENCOUNTER — APPOINTMENT (OUTPATIENT)
Dept: CARDIOLOGY | Facility: HOSPITAL | Age: 69
End: 2024-07-09
Payer: COMMERCIAL

## 2024-07-09 DIAGNOSIS — I15.9 SECONDARY HYPERTENSION: Primary | ICD-10-CM

## 2024-08-07 ENCOUNTER — APPOINTMENT (OUTPATIENT)
Dept: RADIOLOGY | Facility: HOSPITAL | Age: 69
End: 2024-08-07
Payer: COMMERCIAL

## 2024-08-20 ENCOUNTER — HOSPITAL ENCOUNTER (OUTPATIENT)
Dept: RADIOLOGY | Facility: HOSPITAL | Age: 69
Discharge: HOME | End: 2024-08-20
Payer: COMMERCIAL

## 2024-08-20 DIAGNOSIS — R10.11 RIGHT UPPER QUADRANT ABDOMINAL PAIN: ICD-10-CM

## 2024-08-20 PROCEDURE — 74176 CT ABD & PELVIS W/O CONTRAST: CPT

## 2024-08-20 PROCEDURE — 74176 CT ABD & PELVIS W/O CONTRAST: CPT | Performed by: STUDENT IN AN ORGANIZED HEALTH CARE EDUCATION/TRAINING PROGRAM

## 2024-08-22 ENCOUNTER — APPOINTMENT (OUTPATIENT)
Dept: PRIMARY CARE | Facility: CLINIC | Age: 69
End: 2024-08-22
Payer: COMMERCIAL

## 2024-08-27 ENCOUNTER — APPOINTMENT (OUTPATIENT)
Dept: RADIOLOGY | Facility: HOSPITAL | Age: 69
End: 2024-08-27
Payer: COMMERCIAL

## 2024-08-29 RX ORDER — LOSARTAN POTASSIUM 100 MG/1
100 TABLET ORAL DAILY
COMMUNITY
Start: 2024-06-12 | End: 2024-09-03 | Stop reason: WASHOUT

## 2024-09-03 ENCOUNTER — OFFICE VISIT (OUTPATIENT)
Dept: CARDIOLOGY | Facility: HOSPITAL | Age: 69
End: 2024-09-03
Payer: COMMERCIAL

## 2024-09-03 VITALS
DIASTOLIC BLOOD PRESSURE: 78 MMHG | BODY MASS INDEX: 33.15 KG/M2 | OXYGEN SATURATION: 97 % | HEIGHT: 65 IN | WEIGHT: 199 LBS | HEART RATE: 80 BPM | SYSTOLIC BLOOD PRESSURE: 145 MMHG

## 2024-09-03 DIAGNOSIS — R07.9 CHEST PAIN, UNSPECIFIED TYPE: Primary | ICD-10-CM

## 2024-09-03 DIAGNOSIS — R07.89 CHEST HEAVINESS: ICD-10-CM

## 2024-09-03 DIAGNOSIS — I10 PRIMARY HYPERTENSION: ICD-10-CM

## 2024-09-03 PROCEDURE — 1159F MED LIST DOCD IN RCRD: CPT | Performed by: INTERNAL MEDICINE

## 2024-09-03 PROCEDURE — 3048F LDL-C <100 MG/DL: CPT | Performed by: INTERNAL MEDICINE

## 2024-09-03 PROCEDURE — 1160F RVW MEDS BY RX/DR IN RCRD: CPT | Performed by: INTERNAL MEDICINE

## 2024-09-03 PROCEDURE — 99204 OFFICE O/P NEW MOD 45 MIN: CPT | Performed by: INTERNAL MEDICINE

## 2024-09-03 PROCEDURE — 1126F AMNT PAIN NOTED NONE PRSNT: CPT | Performed by: INTERNAL MEDICINE

## 2024-09-03 PROCEDURE — 3078F DIAST BP <80 MM HG: CPT | Performed by: INTERNAL MEDICINE

## 2024-09-03 PROCEDURE — 3044F HG A1C LEVEL LT 7.0%: CPT | Performed by: INTERNAL MEDICINE

## 2024-09-03 PROCEDURE — 99214 OFFICE O/P EST MOD 30 MIN: CPT | Performed by: INTERNAL MEDICINE

## 2024-09-03 PROCEDURE — 3008F BODY MASS INDEX DOCD: CPT | Performed by: INTERNAL MEDICINE

## 2024-09-03 PROCEDURE — 3077F SYST BP >= 140 MM HG: CPT | Performed by: INTERNAL MEDICINE

## 2024-09-03 PROCEDURE — 93005 ELECTROCARDIOGRAM TRACING: CPT | Performed by: INTERNAL MEDICINE

## 2024-09-03 RX ORDER — METOPROLOL TARTRATE 100 MG/1
100 TABLET ORAL
Qty: 1 TABLET | Refills: 0 | Status: SHIPPED | OUTPATIENT
Start: 2024-09-03 | End: 2024-09-03

## 2024-09-03 RX ORDER — LOSARTAN POTASSIUM AND HYDROCHLOROTHIAZIDE 25; 100 MG/1; MG/1
1 TABLET ORAL DAILY
Qty: 90 TABLET | Refills: 1 | Status: SHIPPED | OUTPATIENT
Start: 2024-09-03 | End: 2025-03-02

## 2024-09-03 ASSESSMENT — PAIN SCALES - GENERAL: PAINLEVEL: 0-NO PAIN

## 2024-09-03 NOTE — PATIENT INSTRUCTIONS
Thank you for attending the Cardiology clinic at [facility] today. It was nice to meet you.     Your cardiovascular examination was notable for mildly elevated blood pressures. Your ECG showed a normal heart rhythm.    I have changed your blood pressure pill to combined Losartan/hydrochlorothiazide. Monitor your blood pressure at home. Target BP is less than 130/85 mmHg.     To assess your chest heaviness/pressure, I have ordered a coronary CT scan to assess your heart arteries. Please call [phone] to schedule this test.   - You will need to take metoprolol tartrate 100mg 2 hours prior to the CT scan.     It is critical to continue efforts to quit smoking to stop progression of COPD and prevent heart disease.    Continue to follow-up with pulmonology and sleep medicine for management of COPD and ERA.     I will follow-up with you in 3-months to assess your progress.

## 2024-09-03 NOTE — PROGRESS NOTES
Subjective   Cathy Curiel is a 69 y.o. female presents for cardiology review of exertional chest heaviness on a background of asthma/COPD, tobacco dependence/smoking, HTN, DLD, DM2, SCC (LUE).    Investigations:  TTE (6/2022) -  1. The left ventricular systolic function is normal with a 65% estimated ejection fraction. 2. Spectral Doppler shows an impaired relaxation pattern of left ventricular diastolic filling. 3. The estimated PASP is 28 mmHg.  CT chest (3/2024) - 1. Stable benign-appearing pulmonary nodules measuring up to 0.5 cm in the left upper lobe. No new pulmonary nodules. 2. Mild upper lung predominant emphysematous change. 3. Moderate coronary calcifications.  ECG (9/2024) - sinus rhythm, no ischemic changes.     Chief Complaint:  Chest heaviness.     HPI  Chest heaviness  - exertional chest pressure and dyspnea over the last year, progressive.   - left chest, occasional numbness in left arm.    - relieved with rest.    Dyspnea  - feels COPD is not controlled.  - has not been hospitalized for COPD exacerbation.   - continues to smoke 1-pack cigarettes per day. Tried Chantix and nicotine replacement.    - Untreated sleep apnea (CPAP machine is broken).   - on Breztri, Albuterol.   - no ankle swelling, orthopnea or PND.    No palpitations or syncope.     CV risk:  HTN - elevated 140/90s.   Smoking as above.   Chol satisfactory.   Fam hx - HTN no cardiac disease.     Social hx - lives alone, daughter near-by.     ROS  A 10-system review was performed and was unremarkable apart from what is presented in the HPI.     Objective   Physical Exam  Alert and orientated.   Appropriate responses, normal affect.  No respiratory distress at rest.   Skin warm and dry.   Normal radial pulse character and volume. Clinically SR.   Anicteric sclera, no conjunctival pallor.   No JVD or carotid bruits.  Heart sounds dual, no added heart sounds or audible murmurs.   Chest clear on auscultation.   Calves soft,  "non-tender.  No pedal edema.    Lab Review:   Lab Results   Component Value Date     05/14/2024    K 4.2 05/14/2024     05/14/2024    CO2 26 05/14/2024    BUN 20 05/14/2024    CREATININE 0.98 05/14/2024    GLUCOSE 137 (H) 05/14/2024    CALCIUM 9.7 05/14/2024     No results found for: \"CKTOTAL\", \"CKMB\", \"CKMBINDEX\", \"TROPONINI\"  Lab Results   Component Value Date    WBC 9.1 05/14/2024    HGB 13.7 05/14/2024    HCT 45.0 05/14/2024    MCV 93 05/14/2024     05/14/2024     Lab Results   Component Value Date    CHOL 107 05/14/2024    TRIG 84 08/08/2023    HDL 55.8 05/14/2024    LDLDIRECT 38 05/14/2024       Assessment/Plan   In summary, Cathy Curiel is a 69 y.o. female presents for cardiology review of exertional chest heaviness on a background of asthma/COPD, tobacco dependence/smoking, HTN, DLD, DM2, SCC (LUE). She reports a 12-month history of exertional chest heaviness and dyspnea. She is a current smoker of 1-pack of cigarettes per day and feels that her COPD is not well controlled. In clinic today, she appeared euvolemic with no audible wheeze. Her ECG showed normal sinus rhythm without ischemic changes.  I have arranged for a CCTA to assess her exertional chest discomfort. She has known COPD as evident on prior chest imaging and I have reiterated the need to quit smoking. She will continue to follow-up with pulmonary and sleep medicine. She was mildly hypertensive in clinic and I have added hydrochlorothiazide to her existing dose of losartan. I will follow-up with Cathy in 3-months to assess her progress.                  "

## 2024-09-04 ENCOUNTER — APPOINTMENT (OUTPATIENT)
Dept: RADIOLOGY | Facility: HOSPITAL | Age: 69
End: 2024-09-04
Payer: COMMERCIAL

## 2024-09-04 ENCOUNTER — CLINICAL SUPPORT (OUTPATIENT)
Dept: EMERGENCY MEDICINE | Facility: HOSPITAL | Age: 69
End: 2024-09-04
Payer: COMMERCIAL

## 2024-09-04 ENCOUNTER — HOSPITAL ENCOUNTER (EMERGENCY)
Facility: HOSPITAL | Age: 69
Discharge: HOME | End: 2024-09-04
Attending: STUDENT IN AN ORGANIZED HEALTH CARE EDUCATION/TRAINING PROGRAM
Payer: COMMERCIAL

## 2024-09-04 VITALS
HEART RATE: 87 BPM | SYSTOLIC BLOOD PRESSURE: 144 MMHG | BODY MASS INDEX: 33.97 KG/M2 | TEMPERATURE: 97.5 F | HEIGHT: 64 IN | OXYGEN SATURATION: 97 % | DIASTOLIC BLOOD PRESSURE: 72 MMHG | RESPIRATION RATE: 13 BRPM | WEIGHT: 199 LBS

## 2024-09-04 DIAGNOSIS — J44.1 COPD EXACERBATION (MULTI): ICD-10-CM

## 2024-09-04 DIAGNOSIS — L20.89 OTHER ATOPIC DERMATITIS: ICD-10-CM

## 2024-09-04 DIAGNOSIS — D64.9 ANEMIA, UNSPECIFIED TYPE: Primary | ICD-10-CM

## 2024-09-04 LAB
ALBUMIN SERPL BCP-MCNC: 3.9 G/DL (ref 3.4–5)
ALP SERPL-CCNC: 50 U/L (ref 33–136)
ALT SERPL W P-5'-P-CCNC: 18 U/L (ref 7–45)
ANION GAP SERPL CALC-SCNC: 13 MMOL/L (ref 10–20)
AST SERPL W P-5'-P-CCNC: 22 U/L (ref 9–39)
BASOPHILS # BLD AUTO: 0.05 X10*3/UL (ref 0–0.1)
BASOPHILS NFR BLD AUTO: 0.4 %
BILIRUB SERPL-MCNC: 0.3 MG/DL (ref 0–1.2)
BNP SERPL-MCNC: 30 PG/ML (ref 0–99)
BUN SERPL-MCNC: 20 MG/DL (ref 6–23)
CALCIUM SERPL-MCNC: 9.8 MG/DL (ref 8.6–10.6)
CARDIAC TROPONIN I PNL SERPL HS: 3 NG/L (ref 0–34)
CARDIAC TROPONIN I PNL SERPL HS: 3 NG/L (ref 0–34)
CHLORIDE SERPL-SCNC: 106 MMOL/L (ref 98–107)
CO2 SERPL-SCNC: 26 MMOL/L (ref 21–32)
CREAT SERPL-MCNC: 1.02 MG/DL (ref 0.5–1.05)
EGFRCR SERPLBLD CKD-EPI 2021: 60 ML/MIN/1.73M*2
EOSINOPHIL # BLD AUTO: 0.18 X10*3/UL (ref 0–0.7)
EOSINOPHIL NFR BLD AUTO: 1.3 %
ERYTHROCYTE [DISTWIDTH] IN BLOOD BY AUTOMATED COUNT: 15.4 % (ref 11.5–14.5)
FLUAV RNA RESP QL NAA+PROBE: NOT DETECTED
FLUBV RNA RESP QL NAA+PROBE: NOT DETECTED
GLUCOSE SERPL-MCNC: 93 MG/DL (ref 74–99)
HCT VFR BLD AUTO: 31.2 % (ref 36–46)
HGB BLD-MCNC: 10.5 G/DL (ref 12–16)
IMM GRANULOCYTES # BLD AUTO: 0.07 X10*3/UL (ref 0–0.7)
IMM GRANULOCYTES NFR BLD AUTO: 0.5 % (ref 0–0.9)
LYMPHOCYTES # BLD AUTO: 3.15 X10*3/UL (ref 1.2–4.8)
LYMPHOCYTES NFR BLD AUTO: 22.5 %
MCH RBC QN AUTO: 29.7 PG (ref 26–34)
MCHC RBC AUTO-ENTMCNC: 33.7 G/DL (ref 32–36)
MCV RBC AUTO: 88 FL (ref 80–100)
MONOCYTES # BLD AUTO: 1.12 X10*3/UL (ref 0.1–1)
MONOCYTES NFR BLD AUTO: 8 %
NEUTROPHILS # BLD AUTO: 9.41 X10*3/UL (ref 1.2–7.7)
NEUTROPHILS NFR BLD AUTO: 67.3 %
NRBC BLD-RTO: 0 /100 WBCS (ref 0–0)
PLATELET # BLD AUTO: 349 X10*3/UL (ref 150–450)
POTASSIUM SERPL-SCNC: 4 MMOL/L (ref 3.5–5.3)
PROT SERPL-MCNC: 7.1 G/DL (ref 6.4–8.2)
RBC # BLD AUTO: 3.53 X10*6/UL (ref 4–5.2)
SARS-COV-2 RNA RESP QL NAA+PROBE: NOT DETECTED
SODIUM SERPL-SCNC: 141 MMOL/L (ref 136–145)
WBC # BLD AUTO: 14 X10*3/UL (ref 4.4–11.3)

## 2024-09-04 PROCEDURE — 99285 EMERGENCY DEPT VISIT HI MDM: CPT | Mod: 25

## 2024-09-04 PROCEDURE — 93005 ELECTROCARDIOGRAM TRACING: CPT

## 2024-09-04 PROCEDURE — 2500000002 HC RX 250 W HCPCS SELF ADMINISTERED DRUGS (ALT 637 FOR MEDICARE OP, ALT 636 FOR OP/ED)

## 2024-09-04 PROCEDURE — 71046 X-RAY EXAM CHEST 2 VIEWS: CPT

## 2024-09-04 PROCEDURE — 80053 COMPREHEN METABOLIC PANEL: CPT | Performed by: EMERGENCY MEDICINE

## 2024-09-04 PROCEDURE — 85025 COMPLETE CBC W/AUTO DIFF WBC: CPT | Performed by: EMERGENCY MEDICINE

## 2024-09-04 PROCEDURE — 36415 COLL VENOUS BLD VENIPUNCTURE: CPT | Performed by: STUDENT IN AN ORGANIZED HEALTH CARE EDUCATION/TRAINING PROGRAM

## 2024-09-04 PROCEDURE — 71046 X-RAY EXAM CHEST 2 VIEWS: CPT | Performed by: RADIOLOGY

## 2024-09-04 PROCEDURE — 83880 ASSAY OF NATRIURETIC PEPTIDE: CPT | Performed by: STUDENT IN AN ORGANIZED HEALTH CARE EDUCATION/TRAINING PROGRAM

## 2024-09-04 PROCEDURE — 87502 INFLUENZA DNA AMP PROBE: CPT

## 2024-09-04 PROCEDURE — 36415 COLL VENOUS BLD VENIPUNCTURE: CPT | Performed by: EMERGENCY MEDICINE

## 2024-09-04 PROCEDURE — 84484 ASSAY OF TROPONIN QUANT: CPT | Performed by: EMERGENCY MEDICINE

## 2024-09-04 PROCEDURE — 84484 ASSAY OF TROPONIN QUANT: CPT | Performed by: STUDENT IN AN ORGANIZED HEALTH CARE EDUCATION/TRAINING PROGRAM

## 2024-09-04 PROCEDURE — 80053 COMPREHEN METABOLIC PANEL: CPT | Performed by: STUDENT IN AN ORGANIZED HEALTH CARE EDUCATION/TRAINING PROGRAM

## 2024-09-04 PROCEDURE — 94640 AIRWAY INHALATION TREATMENT: CPT

## 2024-09-04 PROCEDURE — 2500000004 HC RX 250 GENERAL PHARMACY W/ HCPCS (ALT 636 FOR OP/ED)

## 2024-09-04 PROCEDURE — 85025 COMPLETE CBC W/AUTO DIFF WBC: CPT | Performed by: STUDENT IN AN ORGANIZED HEALTH CARE EDUCATION/TRAINING PROGRAM

## 2024-09-04 RX ORDER — HYDROXYZINE PAMOATE 25 MG/1
25 CAPSULE ORAL 3 TIMES DAILY PRN
Qty: 270 CAPSULE | Refills: 0 | Status: SHIPPED | OUTPATIENT
Start: 2024-09-04 | End: 2024-12-03

## 2024-09-04 RX ORDER — PREDNISONE 20 MG/1
40 TABLET ORAL ONCE
Status: COMPLETED | OUTPATIENT
Start: 2024-09-04 | End: 2024-09-04

## 2024-09-04 RX ORDER — IPRATROPIUM BROMIDE AND ALBUTEROL SULFATE 2.5; .5 MG/3ML; MG/3ML
3 SOLUTION RESPIRATORY (INHALATION)
Status: COMPLETED | OUTPATIENT
Start: 2024-09-04 | End: 2024-09-04

## 2024-09-04 RX ORDER — PREDNISONE 20 MG/1
40 TABLET ORAL DAILY
Qty: 8 TABLET | Refills: 0 | Status: SHIPPED | OUTPATIENT
Start: 2024-09-04 | End: 2024-09-08

## 2024-09-04 ASSESSMENT — LIFESTYLE VARIABLES
EVER FELT BAD OR GUILTY ABOUT YOUR DRINKING: NO
HAVE PEOPLE ANNOYED YOU BY CRITICIZING YOUR DRINKING: NO
TOTAL SCORE: 0
EVER HAD A DRINK FIRST THING IN THE MORNING TO STEADY YOUR NERVES TO GET RID OF A HANGOVER: NO
HAVE YOU EVER FELT YOU SHOULD CUT DOWN ON YOUR DRINKING: NO

## 2024-09-04 ASSESSMENT — PAIN DESCRIPTION - LOCATION: LOCATION: CHEST

## 2024-09-04 ASSESSMENT — COLUMBIA-SUICIDE SEVERITY RATING SCALE - C-SSRS
2. HAVE YOU ACTUALLY HAD ANY THOUGHTS OF KILLING YOURSELF?: NO
6. HAVE YOU EVER DONE ANYTHING, STARTED TO DO ANYTHING, OR PREPARED TO DO ANYTHING TO END YOUR LIFE?: NO
1. IN THE PAST MONTH, HAVE YOU WISHED YOU WERE DEAD OR WISHED YOU COULD GO TO SLEEP AND NOT WAKE UP?: NO

## 2024-09-04 ASSESSMENT — PAIN SCALES - GENERAL
PAINLEVEL_OUTOF10: 9
PAINLEVEL_OUTOF10: 0 - NO PAIN

## 2024-09-04 ASSESSMENT — PAIN - FUNCTIONAL ASSESSMENT: PAIN_FUNCTIONAL_ASSESSMENT: 0-10

## 2024-09-04 NOTE — ED PROVIDER NOTES
HPI   Chief Complaint   Patient presents with   • Shortness of Breath   This is a 69-year-old female with a past medical history significant for asthma/COPD, tobacco dependence/smoking, HTN, non insulin dependent DMII who presents to the ED with shortness of breath.  Patient states that for the past 2 weeks she has been experiencing worsening shortness of breath, nonproductive cough and wheezing.  She states that her symptoms feel like prior COPD exacerbations.  She reports that she was spraying bug spray along her kitchen counter earlier today, which worsened her symptoms. She also endorses chest pressure, worse with exertion, that has been ongoing for the past year.  She states that she saw her cardiologist yesterday who arranged for further outpatient testing.    Denies any fevers, chills, headache, dizziness, abdominal pain, N/V/D    Limitations to history: None  Independent Historians: Patient  External Records Reviewed: Prior office notes    Patient History   Past Medical History:   Diagnosis Date   • Asthma (HHS-HCC)    • COPD (chronic obstructive pulmonary disease) (Multi)    • Diabetes mellitus (Multi)    • Other intervertebral disc displacement, lumbar region     Lumbar disc herniation     Past Surgical History:   Procedure Laterality Date   • CT GUIDED PERCUTANEOUS BIOPSY BONE DEEP  8/29/2022    CT GUIDED PERCUTANEOUS BIOPSY BONE DEEP 8/29/2022 [facility]   • OTHER SURGICAL HISTORY  04/04/2022    Shoulder arthroscopy     Family History   Problem Relation Name Age of Onset   • Uterine cancer Mother     • Hypertension Mother          BENIGN   • Other (CVA) Father     • Hypertension Father     • Throat cancer Brother       Social History     Tobacco Use   • Smoking status: Every Day     Types: Cigarettes   • Smokeless tobacco: Never   Vaping Use   • Vaping status: Never Used   Substance Use Topics   • Alcohol use: Never   • Drug use: Never       Physical Exam   ED Triage Vitals [09/04/24 1428]    Temperature Heart Rate Respirations BP   36.4 °C (97.5 °F) 87 (!) 22 133/62      Pulse Ox Temp src Heart Rate Source Patient Position   99 % -- -- --      BP Location FiO2 (%)     -- --       Physical Exam  Constitutional:       General: She is not in acute distress.     Appearance: She is not ill-appearing.   HENT:      Mouth/Throat:      Mouth: Mucous membranes are moist.      Pharynx: Oropharynx is clear.   Cardiovascular:      Rate and Rhythm: Normal rate and regular rhythm.      Heart sounds: Normal heart sounds.   Pulmonary:      Effort: Pulmonary effort is normal. No tachypnea, accessory muscle usage, respiratory distress or retractions.      Breath sounds: Decreased breath sounds present. No wheezing, rhonchi or rales.      Comments: Breath sounds slightly decreased bilaterally  Abdominal:      General: Bowel sounds are normal.      Palpations: Abdomen is soft.      Tenderness: There is no abdominal tenderness. There is no guarding or rebound.   Musculoskeletal:         General: Normal range of motion.      Cervical back: Normal range of motion and neck supple. No rigidity or tenderness.      Right lower leg: No edema.      Left lower leg: No edema.   Skin:     General: Skin is warm and dry.   Neurological:      General: No focal deficit present.      Mental Status: She is alert and oriented to person, place, and time.         ED Course & MDM   ED Course as of 09/04/24 2102   Wed Sep 04, 2024   1433 ECG 12 lead  EKG shows HR 77 with sinus rhythm, normal axis, OH interval 136, QRS duration 78.   Normal ST and T wave pattern with no evidence of STEMI or acute ischemia  [LH]   1904 HEMOGLOBIN(!): 10.5  Down from 13.7 from results three months ago. Patient states that she has a prior history of GI bleed years ago.  She has been on aspirin previously and was told to stop it at the time of the GI bleed.  She states that approximately 2 weeks ago she restarted the aspirin on her own.  She denies any hematuria,  hematemesis, melena or hematochezia. She has an upcoming appointment for a colonoscopy scheduled for 11/22/24. Counseled her to stop the aspirin.  [LH]   1904 WBC(!): 14.0  COVID/Flu negative [LH]   1940 XR chest 2 views  My independent interpretation shows no acute cardiopulmonary pathology [LH]   2050 Patient states she is feeling better and would like to leave [LH]      ED Course User Index  [LH] Nano Kamara PA-C         Diagnoses as of 09/04/24 2102   Anemia, unspecified type   COPD exacerbation (Multi)         Medical Decision Making  This is a 69-year-old female with a past medical history significant for asthma/COPD, tobacco dependence/smoking, HTN, non insulin dependent DMII who presents to the ED with shortness of breath.  Patient states that for the past 2 weeks she has been experiencing worsening shortness of breath, nonproductive cough and wheezing.  She states that her symptoms feel like prior COPD exacerbations.  She reports that she was spraying bug spray along her kitchen counter earlier today, which worsened her symptoms.    On physical exam, patient is overall well-appearing and in no acute distress.  She is speaking in complete sentences with normal respiratory effort, no tachypnea or dyspnea.  O2 saturation is normal on room air.  Lung sounds are slightly decreased bilaterally, no wheezing, rales or crackles.     Administered DuoNeb x3 and Prednisone for COPD exacerbation.  CBC shows a new anemia and leukocytosis at 14.0 Chest xray shows no consolidation, pleural effusion or pneumothorax.  COVID and flu swab is negative.  Troponin is normal, EKG shows no evidence of STEMI or acute ischemia, low suspicion for ACS. BNP is normal, with clear lung sounds and no peripheral edema, low suspicion for a CHF exacerbation.     On reassessment, patient states that she feels much better than her initial ED presentation and that she would like to go home. Prescribed prednisone to complete a 5-day course for  COPD exacerbation.  Patient has an upcoming scheduled appointment with her PCP on 9/6/24. Provided outpatient referrals to pulmonology and GI.  Counseled patient to return to the ED with any new or worsening symptoms.  Patient verbalized understanding of is agreeable to plan of care.  Discharged in stable condition.       Labs Reviewed   CBC WITH AUTO DIFFERENTIAL - Abnormal       Result Value    WBC 14.0 (*)     nRBC 0.0      RBC 3.53 (*)     Hemoglobin 10.5 (*)     Hematocrit 31.2 (*)     MCV 88      MCH 29.7      MCHC 33.7      RDW 15.4 (*)     Platelets 349      Neutrophils % 67.3      Immature Granulocytes %, Automated 0.5      Lymphocytes % 22.5      Monocytes % 8.0      Eosinophils % 1.3      Basophils % 0.4      Neutrophils Absolute 9.41 (*)     Immature Granulocytes Absolute, Automated 0.07      Lymphocytes Absolute 3.15      Monocytes Absolute 1.12 (*)     Eosinophils Absolute 0.18      Basophils Absolute 0.05     COMPREHENSIVE METABOLIC PANEL - Abnormal    Glucose 93      Sodium 141      Potassium 4.0      Chloride 106      Bicarbonate 26      Anion Gap 13      Urea Nitrogen 20      Creatinine 1.02      eGFR 60 (*)     Calcium 9.8      Albumin 3.9      Alkaline Phosphatase 50      Total Protein 7.1      AST 22      Bilirubin, Total 0.3      ALT 18     B-TYPE NATRIURETIC PEPTIDE - Normal    BNP 30      Narrative:        <100 pg/mL - Heart failure unlikely  100-299 pg/mL - Intermediate probability of acute heart                  failure exacerbation. Correlate with clinical                  context and patient history.    >=300 pg/mL - Heart Failure likely. Correlate with clinical                  context and patient history.     Biotin interference may cause falsely decreased results. Patients taking a Biotin dose of up to 5 mg/day should refrain from taking Biotin for 24 hours before sample  collection. Providers may contact their local laboratory for further information.   SERIAL TROPONIN-INITIAL -  Normal    Troponin I, High Sensitivity ([facility]) 3      Narrative:     Less than 99th percentile of normal range cutoff-  Female and children under 18 years old <35 ng/L; Male <54 ng/L: Negative  Repeat testing should be performed if clinically indicated.     Female and children under 18 years old  ng/L; Male  ng/L:  Consistent with possible cardiac damage and possible increased clinical   risk. Serial measurements may help to assess extent of myocardial damage.     >120 ng/L: Consistent with cardiac damage, increased clinical risk and  myocardial infarction. Serial measurements may help assess extent of   myocardial damage.      NOTE: Children less than 1 year old may have higher baseline troponin   levels and results should be interpreted in conjunction with the overall   clinical context.    NOTE: Troponin I testing is performed using a different   testing methodology at [facility] than at other   [facility]. Direct result comparisons should only   be made within the same method.     SARS-COV-2 PCR - Normal    Coronavirus 2019, PCR Not Detected      Narrative:     This assay has received FDA Emergency Use Authorization (EUA) and is only authorized for the duration of time that circumstances exist to justify the authorization of the emergency use of in vitro diagnostic tests for the detection of SARS-CoV-2 virus and/or diagnosis of COVID-19 infection under section 564(b)(1) of the Act, 21 U.S.C. 360bbb-3(b)(1). This assay is an in vitro diagnostic nucleic acid amplification test for the qualitative detection of SARS-CoV-2 from nasopharyngeal specimens and has been validated for use at [facility]. Negative results do not preclude COVID-19 infections and should not be used as the sole basis for diagnosis, treatment, or other management decisions.     INFLUENZA A AND B PCR - Normal    Flu A Result Not Detected      Flu B Result Not Detected      Narrative:     This  assay is an in vitro diagnostic multiplex nucleic acid amplification test for the detection and discrimination of Influenza A & B from nasopharyngeal specimens, and has been validated for use at [facility]. Negative results do not preclude Influenza A/B infections, and should not be used as the sole basis for diagnosis, treatment, or other management decisions. If Influenza A/B and RSV PCR results are negative, testing for Parainfluenza virus, Adenovirus and Metapneumovirus is routinely performed for [facility] pediatric oncology and intensive care inpatients, and is available on other patients by placing an add-on request.   TROPONIN SERIES- (INITIAL, 1 HR)    Narrative:     The following orders were created for panel order Troponin I Series, High Sensitivity (0, 1 HR).  Procedure                               Abnormality         Status                     ---------                               -----------         ------                     Troponin I, High Sensiti...[[id number]]  Normal              Final result               Troponin, High Sensitivi...[[id number]]                                                   Please view results for these tests on the individual orders.   SERIAL TROPONIN, 1 HOUR          XR chest 2 views   Final Result   No acute cardiopulmonary process.        MACRO:   None        Signed by: Bud Monson 9/4/2024 7:32 PM   Dictation workstation:   [id number]                Nano Kamara PA-C  09/04/24 1815

## 2024-09-04 NOTE — ED TRIAGE NOTES
PT having SOB x2 weeks. Sprayed bug spray this AM making it worse. History COPD. 99% RA asking for O2. States glucose waws 103 this AM

## 2024-09-05 LAB
ATRIAL RATE: 77 BPM
P AXIS: 50 DEGREES
P OFFSET: 204 MS
P ONSET: 157 MS
PR INTERVAL: 136 MS
Q ONSET: 225 MS
QRS COUNT: 12 BEATS
QRS DURATION: 78 MS
QT INTERVAL: 388 MS
QTC CALCULATION(BAZETT): 439 MS
QTC FREDERICIA: 421 MS
R AXIS: 16 DEGREES
T AXIS: 25 DEGREES
T OFFSET: 419 MS
VENTRICULAR RATE: 77 BPM

## 2024-09-06 ENCOUNTER — APPOINTMENT (OUTPATIENT)
Dept: PRIMARY CARE | Facility: CLINIC | Age: 69
End: 2024-09-06
Payer: COMMERCIAL

## 2024-09-06 VITALS
BODY MASS INDEX: 32.99 KG/M2 | TEMPERATURE: 97.9 F | HEART RATE: 81 BPM | HEIGHT: 65 IN | OXYGEN SATURATION: 97 % | WEIGHT: 198 LBS | SYSTOLIC BLOOD PRESSURE: 147 MMHG | DIASTOLIC BLOOD PRESSURE: 79 MMHG

## 2024-09-06 DIAGNOSIS — F17.200 TOBACCO USE DISORDER: ICD-10-CM

## 2024-09-06 DIAGNOSIS — F17.210 CIGARETTE NICOTINE DEPENDENCE WITHOUT COMPLICATION: ICD-10-CM

## 2024-09-06 DIAGNOSIS — J44.9 CHRONIC OBSTRUCTIVE PULMONARY DISEASE, UNSPECIFIED COPD TYPE (MULTI): Primary | ICD-10-CM

## 2024-09-06 DIAGNOSIS — J45.52 SEVERE PERSISTENT ASTHMA WITH STATUS ASTHMATICUS (MULTI): ICD-10-CM

## 2024-09-06 RX ORDER — BUDESONIDE, GLYCOPYRROLATE, AND FORMOTEROL FUMARATE 160; 9; 4.8 UG/1; UG/1; UG/1
2 AEROSOL, METERED RESPIRATORY (INHALATION) 2 TIMES DAILY
Qty: 72 G | Refills: 3 | Status: SHIPPED | OUTPATIENT
Start: 2024-09-06 | End: 2025-09-06

## 2024-09-06 ASSESSMENT — PAIN SCALES - GENERAL: PAINLEVEL: 7

## 2024-09-06 NOTE — PROGRESS NOTES
Subjective   Patient ID: Cathy Curiel is a 69 y.o. female who presents for Establish Care.    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  -has appointment with GI  PLAN  - follow up with GI    #Asthma/COPD  #Tobacco use disorder  - reports albuterol inhaler 6 times daily  - takes budesonide- glycopur- forooterol  - to follow with Pulmonology  - smokes 10 cigarettes each day. Formerly 1 pack each day  Plan  :: Previous imaging shows Stable benign-appearing pulmonary nodules measuring up to 0.5 cm in the left upper lobe  - patient has information for 1800- QUIT NOW  - C/w annual CT lung screening (March 2025)  - follow with pulmonology    #HTN  - /79  - takes losartan-hydrochlorothiazide every day     #DM2  :: A1c 6.3 Jan 2024  -metformin 500mg every day   - rosuvastatin 40mg     Previous history  Past Medical History:   Diagnosis Date    Asthma ([facility])     COPD (chronic obstructive pulmonary disease) (Multi)     Diabetes mellitus (Multi)     Other intervertebral disc displacement, lumbar region     Lumbar disc herniation    Spindle cell sarcoma (Multi)     Left distal arm     Past Surgical History:   Procedure Laterality Date    CT GUIDED PERCUTANEOUS BIOPSY BONE DEEP  08/29/2022    CT GUIDED PERCUTANEOUS BIOPSY BONE DEEP 8/29/2022 [facility]    HYSTERECTOMY      OTHER SURGICAL HISTORY  04/04/2022    Shoulder arthroscopy     Social History     Tobacco Use    Smoking status: Every Day     Current packs/day: 1.00     Average packs/day: 1 pack/day for 40.0 years (40.0 ttl pk-yrs)     Types: Cigarettes     Start date: 9/6/1984    Smokeless tobacco: Never   Vaping Use    Vaping status: Never Used   Substance Use Topics    Alcohol use: Never    Drug use: Never     Family History   Problem Relation Name Age  of Onset    Uterine cancer Mother      Hypertension Mother          BENIGN    Other (CVA) Father      Hypertension Father      Throat cancer Brother       Allergies   Allergen Reactions    Azithromycin Hives, Shortness of breath and Swelling     feet swell and blister    Meloxicam Hives and Unknown    Benzonatate Hives, Itching and Rash    Etodolac Other     HA    Iodinated Contrast Media Rash    Latex Rash    Penicillin Swelling    Penicillins Unknown     Current Outpatient Medications   Medication Instructions    acetaminophen (Tylenol) 500 mg tablet 2 tablets, oral, As needed    albuterol 90 mcg/actuation inhaler 1-2 puffs, inhalation, Every 6 hours PRN, Every 4 to 6 hours as needed    albuterol 2.5 mg, nebulization, 4 times daily PRN    alcohol swabs (Alcohol Prep Pads) pads, medicated 1 Pad, topical (top), Daily    ALPRAZolam (XANAX) 1 mg, oral, Once, Take 1 hour prior to planned MRI.    betamethasone valerate (Valisone) 0.1 % ointment Topical, 2 times daily, To raised rough skin until smooth    blood sugar diagnostic (OneTouch Ultra Test) strip TEST ONCE DAILY    budesonide-glycopyr-formoterol (Breztri Aerosphere) 160-9-4.8 mcg/actuation HFA aerosol inhaler 2 puffs, inhalation, 2 times daily    fluticasone (Flonase) 50 mcg/actuation nasal spray 1 spray, Each Nostril, Daily, Shake gently. Before first use, prime pump. After use, clean tip and replace cap.    FreeStyle glucose monitoring (OneTouch Ultra2 Meter) kit 1 each, miscellaneous, Daily    hydrOXYzine pamoate (VISTARIL) 25 mg, oral, 3 times daily PRN    lancets (Onetouch Delica Safety Lancet) 30 gauge misc 1 Lancet, miscellaneous, Daily    losartan-hydrochlorothiazide (Hyzaar) 100-25 mg tablet 1 tablet, oral, Daily    metFORMIN (GLUCOPHAGE) 500 mg, oral, Every Day    metoprolol tartrate (LOPRESSOR) 100 mg, oral, Once PRN Procedure    mv,rex,min/iron/folic acid/lut (COMPLETE MULTI ORAL) 1 capsule, oral, Daily    rosuvastatin (CRESTOR) 40 mg, oral, Every  Day    triamcinolone (Kenalog) 0.1 % ointment Topical, 2 times daily, Apply gently to the affected area three times daily to four times daily       Objective       Physical Exam  HENT:      Head: Normocephalic and atraumatic.   Eyes:      General: No scleral icterus.     Conjunctiva/sclera: Conjunctivae normal.   Cardiovascular:      Rate and Rhythm: Normal rate and regular rhythm.      Heart sounds: No murmur heard.     No friction rub. No gallop.   Pulmonary:      Effort: Pulmonary effort is normal. No respiratory distress.      Breath sounds: Normal breath sounds.   Abdominal:      Palpations: Abdomen is soft.   Musculoskeletal:         General: Normal range of motion.   Skin:     General: Skin is warm and dry.   Neurological:      General: No focal deficit present.      Mental Status: She is alert and oriented to person, place, and time.   Psychiatric:         Mood and Affect: Mood normal.       Assessment/Plan   Cathy Curiel is a 69 y.o. female who presents for the concerns below:    Problem List Items Addressed This Visit       Chronic obstructive pulmonary disease, unspecified COPD type (Multi) - Primary    Nicotine dependence    Asthma ([facility])    Relevant Medications    budesonide-glycopyr-formoterol (Breztri Aerosphere) 160-9-4.8 mcg/actuation HFA aerosol inhaler    Tobacco use disorder     :: Previous imaging shows Stable benign-appearing pulmonary nodules measuring up to 0.5 cm in the left upper lobe  - patient has information for 1800- QUIT NOW  - C/w annual CT lung screening (March 2025)  - follow with pulmonology          Return in : 1 month    Discussed with Dr. Vital      Portions of this note were generated using digital voice recognition software, and may contain grammatical errors       Nicola Mojica, DO  Family Medicine PGY-3

## 2024-09-09 NOTE — ASSESSMENT & PLAN NOTE
:: Previous imaging shows Stable benign-appearing pulmonary nodules measuring up to 0.5 cm in the left upper lobe  - patient has information for 1800- QUIT NOW  - C/w annual CT lung screening (March 2025)  - follow with pulmonology

## 2024-09-11 ENCOUNTER — OFFICE VISIT (OUTPATIENT)
Dept: PULMONOLOGY | Facility: HOSPITAL | Age: 69
End: 2024-09-11
Payer: COMMERCIAL

## 2024-09-11 ENCOUNTER — HOSPITAL ENCOUNTER (OUTPATIENT)
Dept: RADIOLOGY | Facility: HOSPITAL | Age: 69
Discharge: HOME | End: 2024-09-11
Payer: COMMERCIAL

## 2024-09-11 VITALS
DIASTOLIC BLOOD PRESSURE: 69 MMHG | OXYGEN SATURATION: 99 % | HEART RATE: 99 BPM | BODY MASS INDEX: 32.79 KG/M2 | SYSTOLIC BLOOD PRESSURE: 102 MMHG | WEIGHT: 191 LBS

## 2024-09-11 VITALS — BODY MASS INDEX: 33.97 KG/M2 | HEIGHT: 64 IN | WEIGHT: 199 LBS

## 2024-09-11 DIAGNOSIS — F17.200 TOBACCO USE DISORDER: ICD-10-CM

## 2024-09-11 DIAGNOSIS — E11.9 CONTROLLED TYPE 2 DIABETES MELLITUS WITHOUT COMPLICATION, WITHOUT LONG-TERM CURRENT USE OF INSULIN (MULTI): ICD-10-CM

## 2024-09-11 DIAGNOSIS — J44.1 COPD EXACERBATION (MULTI): ICD-10-CM

## 2024-09-11 DIAGNOSIS — J45.40 MODERATE PERSISTENT ASTHMA, UNSPECIFIED WHETHER COMPLICATED (HHS-HCC): Primary | ICD-10-CM

## 2024-09-11 DIAGNOSIS — G47.33 OSA ON CPAP: ICD-10-CM

## 2024-09-11 DIAGNOSIS — J45.909 ASTHMA, UNSPECIFIED ASTHMA SEVERITY, UNSPECIFIED WHETHER COMPLICATED, UNSPECIFIED WHETHER PERSISTENT (HHS-HCC): ICD-10-CM

## 2024-09-11 DIAGNOSIS — Z91.041 CONTRAST MEDIA ALLERGY: Primary | ICD-10-CM

## 2024-09-11 DIAGNOSIS — K21.9 GASTROESOPHAGEAL REFLUX DISEASE, UNSPECIFIED WHETHER ESOPHAGITIS PRESENT: ICD-10-CM

## 2024-09-11 DIAGNOSIS — E78.5 HYPERLIPIDEMIA, UNSPECIFIED HYPERLIPIDEMIA TYPE: ICD-10-CM

## 2024-09-11 DIAGNOSIS — Z12.31 ENCOUNTER FOR SCREENING MAMMOGRAM FOR MALIGNANT NEOPLASM OF BREAST: ICD-10-CM

## 2024-09-11 PROCEDURE — 3044F HG A1C LEVEL LT 7.0%: CPT | Performed by: STUDENT IN AN ORGANIZED HEALTH CARE EDUCATION/TRAINING PROGRAM

## 2024-09-11 PROCEDURE — 1159F MED LIST DOCD IN RCRD: CPT | Performed by: STUDENT IN AN ORGANIZED HEALTH CARE EDUCATION/TRAINING PROGRAM

## 2024-09-11 PROCEDURE — 77067 SCR MAMMO BI INCL CAD: CPT

## 2024-09-11 PROCEDURE — 3078F DIAST BP <80 MM HG: CPT | Performed by: STUDENT IN AN ORGANIZED HEALTH CARE EDUCATION/TRAINING PROGRAM

## 2024-09-11 PROCEDURE — 77067 SCR MAMMO BI INCL CAD: CPT | Performed by: RADIOLOGY

## 2024-09-11 PROCEDURE — 99214 OFFICE O/P EST MOD 30 MIN: CPT | Mod: GC | Performed by: STUDENT IN AN ORGANIZED HEALTH CARE EDUCATION/TRAINING PROGRAM

## 2024-09-11 PROCEDURE — 3074F SYST BP LT 130 MM HG: CPT | Performed by: STUDENT IN AN ORGANIZED HEALTH CARE EDUCATION/TRAINING PROGRAM

## 2024-09-11 PROCEDURE — 99214 OFFICE O/P EST MOD 30 MIN: CPT | Performed by: STUDENT IN AN ORGANIZED HEALTH CARE EDUCATION/TRAINING PROGRAM

## 2024-09-11 PROCEDURE — 3048F LDL-C <100 MG/DL: CPT | Performed by: STUDENT IN AN ORGANIZED HEALTH CARE EDUCATION/TRAINING PROGRAM

## 2024-09-11 PROCEDURE — 1125F AMNT PAIN NOTED PAIN PRSNT: CPT | Performed by: STUDENT IN AN ORGANIZED HEALTH CARE EDUCATION/TRAINING PROGRAM

## 2024-09-11 PROCEDURE — 77063 BREAST TOMOSYNTHESIS BI: CPT | Performed by: RADIOLOGY

## 2024-09-11 RX ORDER — PANTOPRAZOLE SODIUM 40 MG/1
40 TABLET, DELAYED RELEASE ORAL
Qty: 30 TABLET | Refills: 3 | Status: SHIPPED | OUTPATIENT
Start: 2024-09-11

## 2024-09-11 SDOH — ECONOMIC STABILITY: FOOD INSECURITY: WITHIN THE PAST 12 MONTHS, YOU WORRIED THAT YOUR FOOD WOULD RUN OUT BEFORE YOU GOT MONEY TO BUY MORE.: NEVER TRUE

## 2024-09-11 SDOH — ECONOMIC STABILITY: FOOD INSECURITY: WITHIN THE PAST 12 MONTHS, THE FOOD YOU BOUGHT JUST DIDN'T LAST AND YOU DIDN'T HAVE MONEY TO GET MORE.: NEVER TRUE

## 2024-09-11 ASSESSMENT — LIFESTYLE VARIABLES
HOW OFTEN DO YOU HAVE SIX OR MORE DRINKS ON ONE OCCASION: NEVER
AUDIT-C TOTAL SCORE: 0
SKIP TO QUESTIONS 9-10: 1
HOW OFTEN DO YOU HAVE A DRINK CONTAINING ALCOHOL: NEVER
HOW MANY STANDARD DRINKS CONTAINING ALCOHOL DO YOU HAVE ON A TYPICAL DAY: PATIENT DOES NOT DRINK

## 2024-09-11 ASSESSMENT — PAIN SCALES - GENERAL: PAINLEVEL: 8

## 2024-09-11 NOTE — PROGRESS NOTES
"  Department of Medicine I Division of Pulmonary, Critical Care, and Sleep Medicine   [id number] [place] [address] [address] [place] [address]  Phone: [phone]  Fax: [phone]    History of Present Illness   Cathy Curiel is a 69 y.o. female who is a current smoker (~0.5 PPD for 49 years, ~25 total pack years.) here for follow up for COPD & Asthma.    She was last seen in pulmonary clinic in 4/2024. At that time she was evaluated for COPD, and she had already started to report worsening symptoms of dyspnea on exertion. At that time, patient was still on Breztri and PRN Albuterol, and using her albuterol about 6 times a day.    Since her last visit she was seen in ED 9/2024 for shortness of breath: at that time she stated that \"for the past 2 weeks she has been experiencing worsening shortness of breath, nonproductive cough and wheezing.  She states that her symptoms feel like prior COPD exacerbations.\" She was treated with duonebs and prednisone in the ED for a suspected COPD exacerbation, with improvement in symptoms. She was noted to be overall well-appearing and in no acute distress.  She was prescribed prednisone to complete a 5-day course for COPD exacerbation.     At today's visit, she reports that her breathing is very poorly controlled and she feels very short of breath a majority of the time. She reports that there was some improvement after the prednisone treatment from the ED, but since that course ended, she has not felt overall better. She endorses a cough that is newly productive, but says that she often has a cough even at her baseline. She also reports trouble sleeping at night with the coughing and the feeling of pressure in her chest. Her breathing is worsened by any exacerbation - including cleaning, cooking, working at home.     In terms of her inhaler therapy, she is currently on Breztri that she uses twice a day and is very consistent with. Despite this, she has been using her " albuterol inhaler 6-7 times a day for about six months, and in addition, using her albuterol nebulizer 3-4 times a day. Before this subacute exacerbation, she reports her use of the nebulizer therapy was 1-2 times a day, but her MDI use has been that frequent quite persistently.     She endorses some symptoms of acid reflux. Is currently continuing to smoke, but motivated to stop smoking and recently prescribed nicotine patches and lozenges by PCP.    Past Medical History     Past Medical History:   Diagnosis Date    Asthma ([facility])     COPD (chronic obstructive pulmonary disease) (Multi)     Diabetes mellitus (Multi)     Other intervertebral disc displacement, lumbar region     Lumbar disc herniation    Spindle cell sarcoma (Multi)     Left distal arm         Immunizations     Immunization History   Administered Date(s) Administered    Flu vaccine (IIV4), preservative free *Check age/dose* 10/14/2014, 10/06/2015, 10/16/2017, 10/03/2018, 10/29/2019, 11/19/2020    Flu vaccine, quadrivalent, high-dose, preservative free, age 65y+ (FLUZONE) 11/10/2021, 10/11/2023    Influenza, Unspecified 10/19/1999, 11/30/2010, 12/08/2011    Influenza, seasonal, injectable 10/01/2009, 11/09/2016, 11/10/2021    Moderna COVID-19 vaccine, bivalent, blue cap/gray label *Check age/dose* 04/05/2023    Moderna SARS-CoV-2 Vaccination 03/26/2021, 04/23/2021, 12/20/2021    Pneumococcal polysaccharide vaccine, 23-valent, age 2 years and older (PNEUMOVAX 23) 10/14/2014    Tdap vaccine, age 7 year and older (BOOSTRIX, ADACEL) 05/28/2010, 06/08/2021       Medications and Allergies     Current Outpatient Medications   Medication Instructions    acetaminophen (Tylenol) 500 mg tablet 2 tablets, oral, As needed    albuterol 90 mcg/actuation inhaler 1-2 puffs, inhalation, Every 6 hours PRN, Every 4 to 6 hours as needed    albuterol 2.5 mg, nebulization, 4 times daily PRN    alcohol swabs (Alcohol Prep Pads) pads, medicated 1 Pad, topical (top), Daily     ALPRAZolam (XANAX) 1 mg, oral, Once, Take 1 hour prior to planned MRI.    betamethasone valerate (Valisone) 0.1 % ointment Topical, 2 times daily, To raised rough skin until smooth    blood sugar diagnostic (OneTouch Ultra Test) strip TEST ONCE DAILY    budesonide-glycopyr-formoterol (Breztri Aerosphere) 160-9-4.8 mcg/actuation HFA aerosol inhaler 2 puffs, inhalation, 2 times daily    fluticasone (Flonase) 50 mcg/actuation nasal spray 1 spray, Each Nostril, Daily, Shake gently. Before first use, prime pump. After use, clean tip and replace cap.    FreeStyle glucose monitoring (OneTouch Ultra2 Meter) kit 1 each, miscellaneous, Daily    hydrOXYzine pamoate (VISTARIL) 25 mg, oral, 3 times daily PRN    lancets (Onetouch Delica Safety Lancet) 30 gauge misc 1 Lancet, miscellaneous, Daily    losartan-hydrochlorothiazide (Hyzaar) 100-25 mg tablet 1 tablet, oral, Daily    metFORMIN (GLUCOPHAGE) 500 mg, oral, Every Day    metoprolol tartrate (LOPRESSOR) 100 mg, oral, Once PRN Procedure    mv,rex,min/iron/folic acid/lut (COMPLETE MULTI ORAL) 1 capsule, oral, Daily    rosuvastatin (CRESTOR) 40 mg, oral, Every Day    triamcinolone (Kenalog) 0.1 % ointment Topical, 2 times daily, Apply gently to the affected area three times daily to four times daily        Allergies:  Azithromycin, Meloxicam, Benzonatate, Etodolac, Iodinated contrast media, Latex, Penicillin, and Penicillins    Social History   She reports that she has been smoking cigarettes. She started smoking about 40 years ago. She has a 40 pack-year smoking history. She has never used smokeless tobacco. She reports that she does not drink alcohol and does not use drugs.    Smoking History:   Previously a pack a day, now 10 a day  Exposure/Job History:     Family History   Fam hx of sarcoidosis     Surgical History   She has a past surgical history that includes Other surgical history (04/04/2022); CT guided percutaneous biopsy bone deep (08/29/2022); and  "Hysterectomy.    Physical Exam         4/23/2024    10:13 AM 5/14/2024     8:41 AM 9/3/2024     8:42 AM 9/4/2024     2:28 PM 9/4/2024     7:15 PM 9/6/2024     9:50 AM 9/11/2024    10:15 AM   Vitals   Systolic  160 145 133 144 147    Diastolic  75 78 62 72 79    Heart Rate  78 80 87 87 81    Temp    36.4 °C (97.5 °F)  36.6 °C (97.9 °F)    Resp  18  22 13     Height (in) 1.6 m (5' 3\") 1.6 m (5' 3\") 1.638 m (5' 4.5\") 1.626 m (5' 4\")  1.638 m (5' 4.5\") 1.626 m (5' 4\")   Weight (lb) 197 196 199 199  198 199   BMI 34.9 kg/m2 34.72 kg/m2 33.63 kg/m2 34.16 kg/m2  33.46 kg/m2 34.16 kg/m2   BSA (m2) 1.99 m2 1.99 m2 2.03 m2 2.02 m2  2.02 m2 2.02 m2   Visit Report Report Report Report   Report         Physical Exam  Constitutional: alert oriented not in acute distress, but some dyspnea after having walked from waiting room  HEENT: normocephalic atraumatic mmm  CV: normal s1 and s2 and rhythm/rate  Respiratory: clear to auscultation bilaterally  Abdomen: soft non tender non distended    Results   Pulmonary Function Tests:  8/4/22: FEV1/FVC 0.55/ FEV1 1.67 (82%)/ FVC 3.01 (116%)/ TLC 5.11 (115%)/ DLCO 62% -- FEF 25/75 27%     2/8/24 - FEV1/FVC 0.51/ FEV1 1.56 (69% - almost BD resp)/ FVC 2.95 (102%)      FENO: 1/9/24 - 8ppb      6 MWTs: 8/4/22 - 404m (,) 97 -> 95% on RA, DARCIE 0     Chest Radiograph:  XR chest 2 views 09/04/2024  Impression  No acute cardiopulmonary process.  MACRO:  None    Chest CT Scan:  CT LUNG SCREENING LOW DOSE 06/24/2022  FINDINGS:  LUNGS AND AIRWAYS:  The trachea and central airways are patent. No endobronchial lesion  is seen.  Mild upper lobe predominant centrilobular and paraseptal emphysema is  seen. In addition, there is mild mosaic lung attenuation, which may  be seen in the setting of small airway or small vessel disease. There  is mild bibasilar dependent atelectasis. No other focal  consolidation, pleural effusion or pneumothorax.  There is a 5-mm solid noncalcified lung nodule in the left " upper lobe  (axial image 103 of 294), overall stable since the prior study from  2010, consistent with benign etiology given long-term stability.  Additional small 3-mm subpleural solid lung nodule is seen in the  posterior left lower lobe (axial image 231 of 294), not identified  with certainty on the prior study. Another 3-mm subpleural  ground-glass nodule is seen at the periphery of the left upper lobe  (axial image 93 of 294)  MEDIASTINUM AND ASHLEY, LOWER NECK AND AXILLA:  The visualized thyroid gland is enlarged and heterogeneous, overall  stable.  No evidence of thoracic lymphadenopathy by CT criteria. Multiple  small calcified hilar and mediastinal lymph nodes are consistent with  prior granulomatous exposure.  Esophagus appears within normal limits as seen.  HEART AND VESSELS:  The thoracic aorta normal in course and caliber.Moderate  atherosclerosis is present, including calcified and noncalcified  plaques along the visualized thoracoabdominal aorta and major  branches.  Main pulmonary artery and its branches are normal in caliber.  Moderate coronary artery calcifications are seen. Please note,the  study is not optimized for evaluation of coronary arteries.  Heart is at the top limits of normal in size. The cardiac chambers  are not enlarged.  There is no pericardial effusion seen.  UPPER ABDOMEN:  The visualized subdiaphragmatic structures demonstrate stable  bilateral adrenal nodular thickening, left greater than right.  Punctate calcified granuloma is seen in the spleen.  CHEST WALL AND OSSEOUS STRUCTURES:  Chest wall is within normal limits.  No acute osseous pathology. Small sclerotic lesion in the T9  vertebral body is persistent since 2010, likely a bone island.There  are no suspicious osseous lesions.Mild multilevel degenerative  changes within visualized spine.  Impression  1. Few small lung nodules measuring up to 5 mm as above, likely  benign. Continued annual screening with low-dose noncontrast  chest CT  is recommended.  2. Mild emphysema. Prior granulomatous exposure.  3. Borderline cardiomegaly.  4. Moderate coronary artery calcifications, indicating the presence  of coronary artery disease. If the patient has associated symptoms  recommend management as per chest pain guidelines (e.g.  https://doi.org/10.1161/CIR.1804738707395517). If the patient is  asymptomatic consider reviewing modifiable cardiovascular risk  factors and managing as per guidelines for primary prevention (e.g.  https://doi.org/10.1161/CIR.5432416213321755)  5. Other findings as above.    ECHO:  Echocardiogram: 6/8/22 - EF 65%, Diastolic dysfunction. RA normal size, RV normal size and function     Labs:  Lab Results   Component Value Date    WBC 14.0 (H) 09/04/2024    HGB 10.5 (L) 09/04/2024    HCT 31.2 (L) 09/04/2024    MCV 88 09/04/2024     09/04/2024       Lab Results   Component Value Date    CREATININE 1.02 09/04/2024    BUN 20 09/04/2024     09/04/2024    K 4.0 09/04/2024     09/04/2024    CO2 26 09/04/2024        Eosinophils Absolute (x10*3/uL)   Date Value   09/04/2024 0.18     Eosinophils % (%)   Date Value   09/04/2024 1.3          Assessment and Plan   Cathy Curiel is a 69 y.o. female with ERA who is a current smoker (~0.5 PPD for 49 years, ~25 total pack years.) here for follow up for COPD & Asthma. Her symptoms are very poorly controlled, and she experiences dyspnea with even minimal exertion. She is currently on Breztri that she uses twice a day and is very consistent with. Despite this, she has been using her albuterol inhaler 6-7 times a day for about six months, and in addition, using her albuterol nebulizer 3-4 times a day. She endorses some symptoms of acid reflux / GERD. Is currently continuing to smoke, but motivated to stop smoking and recently prescribed nicotine patches and lozenges by PCP.    - Continue current regimen: Breztri scheduled, albuterol PRN  - Start pantoprazole daily  -  Continue to encourage smoking cessation, we discussed a goal of six weeks [patches and lozenges prescribed by PCP]  - Referral to pulmonary rehab  - She will make an appointment with sleep provider to get her CPAP fixed      Follow-up:  6 weeks    Patient was seen, evaluated, and discussed with attending physician, Dr. Borges.    Sanjuana Gomez MD    ======  I saw and evaluated the patient. I personally obtained the key and critical portions of the history and physical exam or was physically present for key and critical portions performed by the resident/fellow. I reviewed the resident/fellow's documentation and discussed the patient with the resident/fellow. I agree with the resident/fellow's medical decision making as documented in the note.    Jack Borges MD

## 2024-09-11 NOTE — PATIENT INSTRUCTIONS
It was a pleasure to see you today in clinic!     Today we discussed your worsening shortness of breath, coughing with sputum production, and symptoms since your visit to the ED last week, which sound like they have been a lot worse in the past 5-6 months.     We will plan to refer you to pulmonary rehab, which will hopefully be very beneficial in helping you control your symptoms. They should call you to schedule this.    Since you have mentioned some acid reflux, we will also trial your on a medication for acid reflux called pantoprazole. Please take this medication once a day (nightly) and we will see if there is any benefit with this.    Please follow up with me in: 6 weeks to see how everything is going.     We talked a lot about your motivation and plan to quit smoking. Good luck, I know you can do it! Remember to prioritize yourself so you can be there for your daughter and grandkids.     Please try and contact your sleep doctor for an appointment so you can get you CPAP re-evaluated so it can be used again!    If you have any further questions feel free to call my office at [phone] (choose #2 to reach a pulmonary nurse) and please allow 1-2 business days for a response.     If you have any scheduling needs feel free to call [phone] (for breathing or walking test), [phone] (for EKG's, echocardiograms or cardiopulmonary stress test), and [phone] (for radiology tests such as CT scan, MRIs and nuclear medicine tests).    Sanjuana Gomez  Pulmonary and Critical Care Fellow     [id number] [place], [place] [id number]

## 2024-09-12 ENCOUNTER — APPOINTMENT (OUTPATIENT)
Dept: PULMONOLOGY | Facility: HOSPITAL | Age: 69
End: 2024-09-12
Payer: COMMERCIAL

## 2024-09-13 NOTE — PROGRESS NOTES
I reviewed the resident/fellow's documentation and discussed the patient with the resident/fellow. I agree with the resident/fellow's medical decision making as documented in the note.    Anat Vital MD

## 2024-09-16 ENCOUNTER — APPOINTMENT (OUTPATIENT)
Dept: GASTROENTEROLOGY | Facility: CLINIC | Age: 69
End: 2024-09-16
Payer: COMMERCIAL

## 2024-09-16 RX ORDER — DIPHENHYDRAMINE HCL 25 MG
CAPSULE ORAL
Qty: 1 CAPSULE | Refills: 0 | Status: SHIPPED | OUTPATIENT
Start: 2024-09-16

## 2024-09-16 RX ORDER — PREDNISONE 50 MG/1
TABLET ORAL
Qty: 3 TABLET | Refills: 0 | Status: SHIPPED | OUTPATIENT
Start: 2024-09-16

## 2024-09-17 ENCOUNTER — TELEPHONE (OUTPATIENT)
Dept: CARDIAC REHAB | Facility: HOSPITAL | Age: 69
End: 2024-09-17
Payer: COMMERCIAL

## 2024-09-17 NOTE — PROGRESS NOTES
PATIENT: Cathy Curiel  DATE: 9/17/2024    Called patient regarding scheduling Pulmonary Rehab initial evaluation  at [facility].   Left voicemail to call our offices to get scheduled upon earliest convenience at [phone].    Krista Montelongo EP

## 2024-09-18 ENCOUNTER — APPOINTMENT (OUTPATIENT)
Dept: DERMATOLOGY | Facility: CLINIC | Age: 69
End: 2024-09-18
Payer: COMMERCIAL

## 2024-09-18 ENCOUNTER — HOSPITAL ENCOUNTER (OUTPATIENT)
Dept: RADIOLOGY | Facility: HOSPITAL | Age: 69
Discharge: HOME | End: 2024-09-18
Payer: COMMERCIAL

## 2024-09-30 ENCOUNTER — HOSPITAL ENCOUNTER (OUTPATIENT)
Dept: RADIOLOGY | Facility: HOSPITAL | Age: 69
Discharge: HOME | End: 2024-09-30
Payer: COMMERCIAL

## 2024-10-02 ENCOUNTER — TELEPHONE (OUTPATIENT)
Dept: ORTHOPEDIC SURGERY | Facility: HOSPITAL | Age: 69
End: 2024-10-02
Payer: COMMERCIAL

## 2024-10-02 DIAGNOSIS — C49.9 SPINDLE CELL SARCOMA (MULTI): ICD-10-CM

## 2024-10-02 RX ORDER — ALPRAZOLAM 1 MG/1
1 TABLET ORAL
Qty: 2 TABLET | Refills: 0 | Status: SHIPPED | OUTPATIENT
Start: 2024-10-02 | End: 2024-10-02

## 2024-10-09 NOTE — PROGRESS NOTES
Gastroenterology Clinic New Consult Note    Reason For Consult: anemia    History Of Present Illness  Cathy Curiel is a 69 y.o. 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    Labs from Sept 2024 w/ new anemia (Hb 10.5) w/ MCV 88. Plts 349. Elevated WBC 14.0. No iron studies. BMP wnls. LFTs wnls. Prior Hb was 13.7 when it was checked in May, 2024; Her baseline is between 12-13 from 2022.     Pt has hx of acute, obscure blood loss anemia. Hb at the time had dropped < 7. Per GI note from  in 2020, she underwent EGD 2020 which was negative. Colon 2020 was negative except for pan-colonic diverticulosis. Capsule w one gastric and one duodenal non-bleeding avms; Push enteroscopy 2020 w one proximal jejunal nonbleeding avm ablated; no gastric avm seen. At the time, she had not seen any overt GI bleeding. Since that time, Hb is between 12-13.     Today her biggest issue is abd gas, bloating and associated pain. This was also an issue back when she was being followed by GI at . At baseline, has 2-3 BM a day, regular caliber, no straining. No blood in stool. No black stool. She does however have episodes of watery diarrhea that last 3-5 days days, this happens every couple weeks. When she is having these episodes has upwards of 10 Bms a day. She does wake at night during these episodes for BM. She suspects it might be associated w/ dairy intake and eggs. These episodes having been going on 3-4 years. No weight loss. No nausea or vomting. No dysphagia or odynophagia. She does endorse some early satiety every couple weeks. Feel fatigued, like she has limited energy. She does think the diarrhea could be related to metformin, which she has been on for ~4 years. The most bothersome symptom are the gas pains she has. Does not take any  medication for such, however she takes a spoon of mustard, which she feels helps w abd cramping. Very rarely she has acid reflux, once every couple months.    She was rx'ed PPI 40 mg daily by pulm for these abd cramps. She felt that she had allergic reaction to this- hot mouth, severe abd pain. She took it for 3 days and then self-d/angelica.     Of note, has colonoscopy scheduled w/ Dr. Flores on 11/22.     Past Medical History  Past Medical History:   Diagnosis Date    Asthma     COPD (chronic obstructive pulmonary disease) (Multi)     Diabetes mellitus (Multi)     Other intervertebral disc displacement, lumbar region     Lumbar disc herniation    Spindle cell sarcoma (Multi)     Left distal arm     Surgical History  Past Surgical History:   Procedure Laterality Date    CT GUIDED PERCUTANEOUS BIOPSY BONE DEEP  08/29/2022    CT GUIDED PERCUTANEOUS BIOPSY BONE DEEP 8/29/2022 [facility]    HYSTERECTOMY      OTHER SURGICAL HISTORY  04/04/2022    Shoulder arthroscopy     Social History:  Cigarettes- 10-12 a day   EtOH: denies  Drugs: denies     Family History  Family History   Problem Relation Name Age of Onset    Uterine cancer Mother      Hypertension Mother          BENIGN    Other (CVA) Father      Hypertension Father      Throat cancer Brother      Breast cancer Other     No family history of GI malignancy or disease.     Allergies  Allergies   Allergen Reactions    Azithromycin Hives, Shortness of breath and Swelling     feet swell and blister    Meloxicam Hives and Unknown    Benzonatate Hives, Itching and Rash    Etodolac Other     HA    Iodinated Contrast Media Rash    Latex Rash    Penicillin Swelling    Penicillins Unknown     Home Medications    Current Outpatient Medications:     acetaminophen (Tylenol) 500 mg tablet, Take 2 tablets (1,000 mg) by mouth if needed., Disp: , Rfl:     albuterol 2.5 mg /3 mL (0.083 %) nebulizer solution, Take 3 mL (2.5 mg) by nebulization 4 times a day as needed for wheezing or  shortness of breath., Disp: 540 mL, Rfl: 3    albuterol 90 mcg/actuation inhaler, Inhale 1-2 puffs every 6 hours if needed for wheezing. Every 4 to 6 hours as needed, Disp: 54 g, Rfl: 3    alcohol swabs (Alcohol Prep Pads) pads, medicated, Apply 1 Pad topically once daily., Disp: 100 each, Rfl: 2    betamethasone valerate (Valisone) 0.1 % ointment, Apply topically 2 times a day. To raised rough skin until smooth, Disp: 45 g, Rfl: 5    blood sugar diagnostic (OneTouch Ultra Test) strip, TEST ONCE DAILY, Disp: 100 strip, Rfl: 3    budesonide-glycopyr-formoterol (Breztri Aerosphere) 160-9-4.8 mcg/actuation HFA aerosol inhaler, Inhale 2 puffs 2 times a day., Disp: 72 g, Rfl: 3    fluticasone (Flonase) 50 mcg/actuation nasal spray, Administer 1 spray into each nostril once daily. Shake gently. Before first use, prime pump. After use, clean tip and replace cap., Disp: 16 g, Rfl: 5    FreeStyle glucose monitoring (OneTouch Ultra2 Meter) kit, 1 each once daily., Disp: 1 each, Rfl: 0    hydrOXYzine pamoate (Vistaril) 25 mg capsule, TAKE 1 CAPSULE (25 MG) BY MOUTH 3 TIMES A DAY AS NEEDED FOR ITCHING., Disp: 270 capsule, Rfl: 0    lancets (Dennoo Delica Safety Lancet) 30 gauge misc, 1 Lancet once daily., Disp: 100 each, Rfl: 3    losartan-hydrochlorothiazide (Hyzaar) 100-25 mg tablet, Take 1 tablet by mouth once daily., Disp: 90 tablet, Rfl: 1    metFORMIN (Glucophage) 500 mg tablet, Take 1 tablet (500 mg) by mouth once every day., Disp: 90 tablet, Rfl: 1    metoprolol tartrate (Lopressor) 100 mg tablet, Take 1 tablet (100 mg) by mouth 1 time if needed (Take 1 hour prior to your cardiac CT scan.) for up to 1 dose., Disp: 1 tablet, Rfl: 0    mv,rex,min/iron/folic acid/lut (COMPLETE MULTI ORAL), Take 1 capsule by mouth 1 (one) time each day., Disp: , Rfl:     rosuvastatin (Crestor) 40 mg tablet, Take 1 tablet (40 mg) by mouth once every day., Disp: 90 tablet, Rfl: 1    triamcinolone (Kenalog) 0.1 % ointment, Apply topically 2  times a day. Apply gently to the affected area three times daily to four times daily, Disp: 80 g, Rfl: 2    ALPRAZolam (Xanax) 1 mg tablet, Take 1 tablet (1 mg) by mouth 1 time if needed for anxiety for up to 1 dose. Take 1 hour prior to planned MRI. (Patient not taking: Reported on 10/10/2024), Disp: 2 tablet, Rfl: 0    dicyclomine (Bentyl) 20 mg tablet, Take 1 tablet (20 mg) by mouth 4 times a day., Disp: 120 tablet, Rfl: 11    diphenhydrAMINE (BENADryl) 25 mg capsule, Take one 25mg tablet one hour prior to your cardiac CT scan (Patient not taking: Reported on 10/10/2024), Disp: 1 capsule, Rfl: 0    pantoprazole (ProtoNix) 40 mg EC tablet, Take 1 tablet (40 mg) by mouth once daily in the morning. Take before meals. Do not crush, chew, or split. (Patient not taking: Reported on 10/10/2024), Disp: 30 tablet, Rfl: 3    predniSONE (Deltasone) 50 mg tablet, Take one 50mg tablet 13 hours, 7 hours, and 1 hour prior to your cardiac CT scan (Patient not taking: Reported on 10/10/2024), Disp: 3 tablet, Rfl: 0    simethicone (Mylicon) 80 mg chewable tablet, Chew 1 tablet (80 mg) every 6 hours if needed for flatulence for up to 10 days., Disp: 30 tablet, Rfl: 0     Physical Exam     Last Recorded Vitals  Blood pressure 98/64, pulse 97, temperature 35.7 °C (96.2 °F), weight 88.2 kg (194 lb 8 oz), SpO2 98%.    General: well-nourished, no acute distress  HEENT: PERRLA, EOM intact, no scleral icterus, moist MM  Respiratory: CTA bilaterally, normal work of breathing  Cardiovascular: RRR, no murmurs/rubs/gallops  Abdomen: Soft, nontender, nondistended, bowel sounds present, no masses palpated, no organomeagly  Extremities: no edema, no asterixis  Neuro: alert and oriented, CNII-XII grossly intact, moves all 4 extremities with no focal deficits    Relevant Results  Lab Results   Component Value Date    WBC 14.0 (H) 09/04/2024    HGB 10.5 (L) 09/04/2024    HCT 31.2 (L) 09/04/2024    MCV 88 09/04/2024     09/04/2024     Lab  Results   Component Value Date    GLUCOSE 93 09/04/2024    CALCIUM 9.8 09/04/2024     09/04/2024    K 4.0 09/04/2024    CO2 26 09/04/2024     09/04/2024    BUN 20 09/04/2024    CREATININE 1.02 09/04/2024     Lab Results   Component Value Date    ALT 18 09/04/2024    AST 22 09/04/2024    ALKPHOS 50 09/04/2024    BILITOT 0.3 09/04/2024     Imaging:  CT 8/20/2024:  IMPRESSION:  1. Partially visualized new bilateral small ground-glass and solid  nodules in the lower lobes, which may represent an  infectious/inflammatory process. Recommend follow-up with dedicated  CT chest.  2. No evidence of acute process in the abdomen or pelvis.  3. Diverticulosis without evidence of acute diverticulitis.    Procedures:  VCE 2022:  Impression:   In the visualized small bowel, a single gastric and duodenal AVM   noted     Per metro note:  egd 1-20 negative.   Colonoscopy 1-20 negative except pan colonic diverticulosis. VCE 1-20 one gastric and one duodenal non-bleeding avms; Push enteroscopy 3-20 one proximal jejunal nonbleeding avm ablated; no gastric avm seen.     As per patient, prior colonoscopies w/ multiple polyps (I can see pics in media from colonoscopy 1/7/2016, but cannot find report)    ASSESSMENT/PLAN:  Cathy Curiel is a 69 y.o. 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    On recent ED visit, pt has evidence of new anemia w/ Hb drop of 13.6 --> 10.5. She has hx of bleeding from gastric and duodenal AVMs. Will repeat Hb today and check iron studies. Will add on for enteroscopy with colonoscopy that is already scheduled on Nov 22. If negative, can consider VCE thereafter.     In terms of bloating, will check hydrogen breath test. This has been a long-standing issue, going back at least 5 years. New, however in the last 2-3  years is the intermittent diarrhea. Does seem to coorelate w/ metformin, can consider adjusting meds.     #anemia:  - CBC, iron panel + ferritin today  - ordered enteroscopy to be scheduled w/ colonoscopy on 11/22  - can consider VCE if above is negative     #diarrhea, bloating, abd pain:  - random colonic biopsies w/ colonoscopy on 11/22  - hydrogen breath test  - ordered for simethicone + bentyl PRN     Pt seen and discussed with attending, Dr. Eldridge.     Rowan Bustamante MD

## 2024-10-10 ENCOUNTER — OFFICE VISIT (OUTPATIENT)
Dept: GASTROENTEROLOGY | Facility: HOSPITAL | Age: 69
End: 2024-10-10
Payer: COMMERCIAL

## 2024-10-10 VITALS
WEIGHT: 194.5 LBS | OXYGEN SATURATION: 98 % | TEMPERATURE: 96.2 F | SYSTOLIC BLOOD PRESSURE: 98 MMHG | BODY MASS INDEX: 33.39 KG/M2 | DIASTOLIC BLOOD PRESSURE: 64 MMHG | HEART RATE: 97 BPM

## 2024-10-10 DIAGNOSIS — R14.0 BLOATING: ICD-10-CM

## 2024-10-10 DIAGNOSIS — D64.9 ANEMIA, UNSPECIFIED TYPE: Primary | ICD-10-CM

## 2024-10-10 RX ORDER — SIMETHICONE 80 MG
80 TABLET,CHEWABLE ORAL EVERY 6 HOURS PRN
Qty: 30 TABLET | Refills: 0 | Status: SHIPPED | OUTPATIENT
Start: 2024-10-10 | End: 2024-10-20

## 2024-10-10 RX ORDER — DICYCLOMINE HYDROCHLORIDE 20 MG/1
20 TABLET ORAL 4 TIMES DAILY
Qty: 120 TABLET | Refills: 11 | Status: SHIPPED | OUTPATIENT
Start: 2024-10-10 | End: 2025-10-10

## 2024-10-10 SDOH — ECONOMIC STABILITY: FOOD INSECURITY: WITHIN THE PAST 12 MONTHS, YOU WORRIED THAT YOUR FOOD WOULD RUN OUT BEFORE YOU GOT MONEY TO BUY MORE.: SOMETIMES TRUE

## 2024-10-10 SDOH — ECONOMIC STABILITY: FOOD INSECURITY: WITHIN THE PAST 12 MONTHS, THE FOOD YOU BOUGHT JUST DIDN'T LAST AND YOU DIDN'T HAVE MONEY TO GET MORE.: SOMETIMES TRUE

## 2024-10-10 ASSESSMENT — LIFESTYLE VARIABLES
SKIP TO QUESTIONS 9-10: 1
AUDIT-C TOTAL SCORE: 0
HOW MANY STANDARD DRINKS CONTAINING ALCOHOL DO YOU HAVE ON A TYPICAL DAY: PATIENT DOES NOT DRINK
HOW OFTEN DO YOU HAVE A DRINK CONTAINING ALCOHOL: NEVER
HOW OFTEN DO YOU HAVE SIX OR MORE DRINKS ON ONE OCCASION: NEVER

## 2024-10-10 ASSESSMENT — PATIENT HEALTH QUESTIONNAIRE - PHQ9
1. LITTLE INTEREST OR PLEASURE IN DOING THINGS: NOT AT ALL
2. FEELING DOWN, DEPRESSED OR HOPELESS: NOT AT ALL
SUM OF ALL RESPONSES TO PHQ9 QUESTIONS 1 & 2: 0

## 2024-10-10 ASSESSMENT — PAIN SCALES - GENERAL: PAINLEVEL: 6

## 2024-10-10 NOTE — PATIENT INSTRUCTIONS
Thank you for seeing us in gastroenterology clinic.     Today you will have blood work done to assess your blood levels and iron levels. I have ordered you for a breath test due to your bloating and gas. I have also asked the endoscopy team to schedule you for an upper endoscopy with your colonoscopy that is scheduled for Nov 22nd.     I have ordered you for simethicone tabs to take to break up gas bubbles (you can this every 6 hours) and bentyl tablets to help with gas pain (you can take this every 6 hours).     Please do come into the emergency room if you notice any blood in your stool.

## 2024-10-11 ENCOUNTER — APPOINTMENT (OUTPATIENT)
Dept: PRIMARY CARE | Facility: CLINIC | Age: 69
End: 2024-10-11
Payer: COMMERCIAL

## 2024-10-15 ENCOUNTER — LAB (OUTPATIENT)
Dept: LAB | Facility: LAB | Age: 69
End: 2024-10-15
Payer: COMMERCIAL

## 2024-10-15 DIAGNOSIS — D64.9 ANEMIA, UNSPECIFIED TYPE: ICD-10-CM

## 2024-10-15 LAB
ANION GAP SERPL CALC-SCNC: 15 MMOL/L (ref 10–20)
BUN SERPL-MCNC: 20 MG/DL (ref 6–23)
CALCIUM SERPL-MCNC: 9.8 MG/DL (ref 8.6–10.6)
CHLORIDE SERPL-SCNC: 102 MMOL/L (ref 98–107)
CO2 SERPL-SCNC: 25 MMOL/L (ref 21–32)
CREAT SERPL-MCNC: 1.11 MG/DL (ref 0.5–1.05)
EGFRCR SERPLBLD CKD-EPI 2021: 54 ML/MIN/1.73M*2
ERYTHROCYTE [DISTWIDTH] IN BLOOD BY AUTOMATED COUNT: 14 % (ref 11.5–14.5)
FERRITIN SERPL-MCNC: 33 NG/ML (ref 8–150)
GLUCOSE SERPL-MCNC: 120 MG/DL (ref 74–99)
HCT VFR BLD AUTO: 42.8 % (ref 36–46)
HGB BLD-MCNC: 13.9 G/DL (ref 12–16)
IRON SATN MFR SERPL: 11 % (ref 25–45)
IRON SERPL-MCNC: 48 UG/DL (ref 35–150)
MCH RBC QN AUTO: 29.5 PG (ref 26–34)
MCHC RBC AUTO-ENTMCNC: 32.5 G/DL (ref 32–36)
MCV RBC AUTO: 91 FL (ref 80–100)
NRBC BLD-RTO: 0 /100 WBCS (ref 0–0)
PLATELET # BLD AUTO: 380 X10*3/UL (ref 150–450)
POTASSIUM SERPL-SCNC: 4.3 MMOL/L (ref 3.5–5.3)
RBC # BLD AUTO: 4.71 X10*6/UL (ref 4–5.2)
SODIUM SERPL-SCNC: 138 MMOL/L (ref 136–145)
TIBC SERPL-MCNC: 427 UG/DL (ref 240–445)
UIBC SERPL-MCNC: 379 UG/DL (ref 110–370)
WBC # BLD AUTO: 11.9 X10*3/UL (ref 4.4–11.3)

## 2024-10-15 PROCEDURE — 80048 BASIC METABOLIC PNL TOTAL CA: CPT

## 2024-10-15 PROCEDURE — 83550 IRON BINDING TEST: CPT

## 2024-10-15 PROCEDURE — 36415 COLL VENOUS BLD VENIPUNCTURE: CPT

## 2024-10-15 PROCEDURE — 85027 COMPLETE CBC AUTOMATED: CPT

## 2024-10-15 PROCEDURE — 83540 ASSAY OF IRON: CPT

## 2024-10-15 PROCEDURE — 82728 ASSAY OF FERRITIN: CPT

## 2024-10-23 ENCOUNTER — HOSPITAL ENCOUNTER (OUTPATIENT)
Dept: RADIOLOGY | Facility: HOSPITAL | Age: 69
Discharge: HOME | End: 2024-10-23
Payer: COMMERCIAL

## 2024-10-23 ENCOUNTER — OFFICE VISIT (OUTPATIENT)
Dept: PULMONOLOGY | Facility: HOSPITAL | Age: 69
End: 2024-10-23
Payer: COMMERCIAL

## 2024-10-23 VITALS
DIASTOLIC BLOOD PRESSURE: 72 MMHG | WEIGHT: 194 LBS | HEART RATE: 98 BPM | TEMPERATURE: 97.6 F | OXYGEN SATURATION: 99 % | BODY MASS INDEX: 33.3 KG/M2 | SYSTOLIC BLOOD PRESSURE: 110 MMHG

## 2024-10-23 DIAGNOSIS — C49.9 SPINDLE CELL SARCOMA (MULTI): ICD-10-CM

## 2024-10-23 DIAGNOSIS — J44.1 COPD EXACERBATION (MULTI): ICD-10-CM

## 2024-10-23 PROCEDURE — A9575 INJ GADOTERATE MEGLUMI 0.1ML: HCPCS | Performed by: STUDENT IN AN ORGANIZED HEALTH CARE EDUCATION/TRAINING PROGRAM

## 2024-10-23 PROCEDURE — 73220 MRI UPPR EXTREMITY W/O&W/DYE: CPT | Mod: LT

## 2024-10-23 PROCEDURE — 2550000001 HC RX 255 CONTRASTS: Performed by: STUDENT IN AN ORGANIZED HEALTH CARE EDUCATION/TRAINING PROGRAM

## 2024-10-23 RX ORDER — GADOTERATE MEGLUMINE 376.9 MG/ML
17 INJECTION INTRAVENOUS
Status: COMPLETED | OUTPATIENT
Start: 2024-10-23 | End: 2024-10-23

## 2024-10-23 ASSESSMENT — PAIN SCALES - GENERAL: PAINLEVEL_OUTOF10: 5

## 2024-10-23 NOTE — PATIENT INSTRUCTIONS
It was a pleasure to see you today in clinic!     Today we discussed your shortness of breath and smoking.    Today, we will continue your regimen as current, with the regular Breztri twice a day, and as needed albuterol.    It is very crucial that you work on smoking cessation, as your symptoms will unfortunately continue until you are able to be free of smoking. We decided on a stop date of 10/28/24.    Please follow up with me in: 2 months.    If you have any further questions feel free to call my office at [phone] (choose #2 to reach a pulmonary nurse) and please allow 1-2 business days for a response.     If you have any scheduling needs feel free to call [phone] (for breathing or walking test), [phone] (for EKG's, echocardiograms or cardiopulmonary stress test), and [phone] (for radiology tests such as CT scan, MRIs and nuclear medicine tests).    Sanjuana Gomez  Pulmonary and Critical Care Fellow     [address] [place], [place] [id number]

## 2024-10-25 ENCOUNTER — TELEPHONE (OUTPATIENT)
Dept: ORTHOPEDIC SURGERY | Facility: HOSPITAL | Age: 69
End: 2024-10-25
Payer: COMMERCIAL

## 2024-10-28 DIAGNOSIS — C49.9 SPINDLE CELL SARCOMA (MULTI): ICD-10-CM

## 2024-10-28 RX ORDER — ALPRAZOLAM 1 MG/1
1 TABLET ORAL
Qty: 2 TABLET | Refills: 0 | Status: SHIPPED | OUTPATIENT
Start: 2024-10-28 | End: 2024-10-28

## 2024-10-29 NOTE — PROGRESS NOTES
Department of Medicine I Division of Pulmonary, Critical Care, and Sleep Medicine   [id number] [place] [address] [address] [place] [address]  Phone: [phone]  Fax: [phone]    History of Present Illness   Cathy Curiel is a 69 y.o. female who is a current smoker (~0.5 PPD for 49 years, ~25 total pack years.) here for follow up for COPD & Asthma.    She was last seen in pulmonary clinic in 9/2024. At that time she was evaluated for COPD, with worsening symptoms of dyspnea on exertion, on Breztri and PRN Albuterol, and using her albuterol about 6 times a day. She had also been recently seen in the ED for shortness of breath treated with a 5 day course of steroids. She continues to have poorly controlled symptoms at the visit, and referral was made to pulmonary rehabilitation.     At that visit, she also reported that her breathing is very poorly controlled and she feels very short of breath a majority of the time, reported some improvement after the prednisone treatment from the ED, and endorsed a cough (which she feels is her baseline). She also endorsed symptoms of acid reflux. Is currently continuing to smoke, but motivated to stop smoking and recently prescribed nicotine patches and lozenges by PCP.    Today, she continues to have poorly controlled symptoms of her COPD, though reports that she is consistent with her Breztri and has started taking pantoprazole. She is using her albuterol somewhat scheduled morning and night (though she is taking is as a PRN), and is using it overall less often, though she is trying not to use it as much as she was (not because her symptoms are better controlled). She is also using her flonase twice a day.    She is unfortunately continuing to smoke, though she has reduced the amount (now smoking about 1/2 pack per day rather than full pack). She has not started to join pulmonary rehab yet.    Past Medical History     Past Medical History:   Diagnosis Date    Asthma      COPD (chronic obstructive pulmonary disease) (Multi)     Diabetes mellitus (Multi)     Other intervertebral disc displacement, lumbar region     Lumbar disc herniation    Spindle cell sarcoma (Multi)     Left distal arm         Immunizations     Immunization History   Administered Date(s) Administered    Flu vaccine (IIV4), preservative free *Check age/dose* 10/14/2014, 10/06/2015, 10/16/2017, 10/03/2018, 10/29/2019, 11/19/2020    Flu vaccine, quadrivalent, high-dose, preservative free, age 65y+ (FLUZONE) 11/10/2021, 10/11/2023    Flu vaccine, trivalent, preservative free, HIGH-DOSE, age 65y+ (Fluzone) 10/23/2024    Influenza, Unspecified 10/19/1999, 11/30/2010, 12/08/2011    Influenza, seasonal, injectable 10/01/2009, 11/09/2016, 11/10/2021    Moderna COVID-19 vaccine, bivalent, blue cap/gray label *Check age/dose* 04/05/2023    Moderna SARS-CoV-2 Vaccination 03/26/2021, 04/23/2021, 12/20/2021    Pneumococcal polysaccharide vaccine, 23-valent, age 2 years and older (PNEUMOVAX 23) 10/14/2014    Tdap vaccine, age 7 year and older (BOOSTRIX, ADACEL) 05/28/2010, 06/08/2021       Medications and Allergies     Current Outpatient Medications   Medication Instructions    acetaminophen (Tylenol) 500 mg tablet 2 tablets, oral, As needed    albuterol 90 mcg/actuation inhaler 1-2 puffs, inhalation, Every 6 hours PRN, Every 4 to 6 hours as needed    albuterol 2.5 mg, nebulization, 4 times daily PRN    alcohol swabs (Alcohol Prep Pads) pads, medicated 1 Pad, topical (top), Daily    ALPRAZolam (XANAX) 1 mg, oral, Once PRN Procedure, Take 1 hour prior to planned MRI.    betamethasone valerate (Valisone) 0.1 % ointment Topical, 2 times daily, To raised rough skin until smooth    blood sugar diagnostic (OneTouch Ultra Test) strip TEST ONCE DAILY    budesonide-glycopyr-formoterol (Breztri Aerosphere) 160-9-4.8 mcg/actuation HFA aerosol inhaler 2 puffs, inhalation, 2 times daily    dicyclomine (BENTYL) 20 mg, oral, 4 times daily     diphenhydrAMINE (BENADryl) 25 mg capsule Take one 25mg tablet one hour prior to your cardiac CT scan    fluticasone (Flonase) 50 mcg/actuation nasal spray 1 spray, Each Nostril, Daily, Shake gently. Before first use, prime pump. After use, clean tip and replace cap.    FreeStyle glucose monitoring (OneTouch Ultra2 Meter) kit 1 each, miscellaneous, Daily    hydrOXYzine pamoate (VISTARIL) 25 mg, oral, 3 times daily PRN    lancets (Yamlitouch Delica Safety Lancet) 30 gauge misc 1 Lancet, miscellaneous, Daily    losartan-hydrochlorothiazide (Hyzaar) 100-25 mg tablet 1 tablet, oral, Daily    metFORMIN (GLUCOPHAGE) 500 mg, oral, Every Day    metoprolol tartrate (LOPRESSOR) 100 mg, oral, Once PRN Procedure    mv,rex,min/iron/folic acid/lut (COMPLETE MULTI ORAL) 1 capsule, oral, Daily    pantoprazole (PROTONIX) 40 mg, oral, Daily before breakfast, Do not crush, chew, or split.    predniSONE (Deltasone) 50 mg tablet Take one 50mg tablet 13 hours, 7 hours, and 1 hour prior to your cardiac CT scan    rosuvastatin (CRESTOR) 40 mg, oral, Every Day    triamcinolone (Kenalog) 0.1 % ointment Topical, 2 times daily, Apply gently to the affected area three times daily to four times daily        Allergies:  Azithromycin, Meloxicam, Benzonatate, Etodolac, Iodinated contrast media, Latex, Penicillin, and Penicillins    Social History   She reports that she has been smoking cigarettes. She started smoking about 40 years ago. She has a 40.1 pack-year smoking history. She has never used smokeless tobacco. She reports that she does not currently use alcohol. She reports that she does not use drugs.    Smoking History:   Previously a pack a day, now 10 a day    Family History   Fam hx of sarcoidosis     Surgical History   She has a past surgical history that includes Other surgical history (04/04/2022); CT guided percutaneous biopsy bone deep (08/29/2022); and Hysterectomy.    Physical Exam         9/4/2024     2:28 PM 9/4/2024     7:15 PM  "9/6/2024     9:50 AM 9/11/2024    10:15 AM 9/11/2024     3:35 PM 10/10/2024     3:01 PM 10/23/2024     2:08 PM   Vitals   Systolic 133 144 147  102 98 110   Diastolic 62 72 79  69 64 72   Heart Rate 87 87 81  99 97 98   Temp 36.4 °C (97.5 °F)  36.6 °C (97.9 °F)   35.7 °C (96.2 °F) 36.4 °C (97.6 °F)   Resp 22 13        Height (in) 1.626 m (5' 4\")  1.638 m (5' 4.5\") 1.626 m (5' 4\")      Weight (lb) 199  198 199 191 194.5 194   BMI 34.16 kg/m2  33.46 kg/m2 34.16 kg/m2 32.79 kg/m2 33.39 kg/m2 33.3 kg/m2   BSA (m2) 2.02 m2  2.02 m2 2.02 m2 1.98 m2 2 m2 1.99 m2   Visit Report   Report Report Report Report Report        Physical Exam  Constitutional: alert oriented not in acute distress, but some dyspnea after having walked from waiting room  HEENT: normocephalic atraumatic mmm  CV: normal s1 and s2 and rhythm/rate  Respiratory: clear to auscultation bilaterally  Abdomen: soft non tender non distended    Results   Pulmonary Function Tests:  8/4/22: FEV1/FVC 0.55/ FEV1 1.67 (82%)/ FVC 3.01 (116%)/ TLC 5.11 (115%)/ DLCO 62% -- FEF 25/75 27%   2/8/24: - FEV1/FVC 0.51/ FEV1 1.56 (69% - almost BD resp)/ FVC 2.95 (102%)    FENO: 1/9/24 - 8ppb    6 MWTs: 8/4/22 - 404m (,) 97 -> 95% on RA, DARCIE 0     Chest Radiograph:  XR chest 2 views 09/04/2024  Impression  No acute cardiopulmonary process.  MACRO:  None    Chest CT Scan:  CT LUNG SCREENING LOW DOSE 06/24/2022  FINDINGS:  LUNGS AND AIRWAYS:  The trachea and central airways are patent. No endobronchial lesion is seen.  Mild upper lobe predominant centrilobular and paraseptal emphysema is seen. In addition, there is mild mosaic lung attenuation, which may be seen in the setting of small airway or small vessel disease. There is mild bibasilar dependent atelectasis. No other focal consolidation, pleural effusion or pneumothorax.  There is a 5-mm solid noncalcified lung nodule in the left upper lobe (axial image 103 of 294), overall stable since the prior study from 2010, " consistent with benign etiology given long-term stability.  Additional small 3-mm subpleural solid lung nodule is seen in the posterior left lower lobe (axial image 231 of 294), not identified with certainty on the prior study. Another 3-mm subpleural ground-glass nodule is seen at the periphery of the left upper lobe (axial image 93 of 294)  MEDIASTINUM AND ASHLEY, LOWER NECK AND AXILLA:  The visualized thyroid gland is enlarged and heterogeneous, overall stable.  No evidence of thoracic lymphadenopathy by CT criteria. Multiple small calcified hilar and mediastinal lymph nodes are consistent with prior granulomatous exposure.  Esophagus appears within normal limits as seen.  HEART AND VESSELS:  The thoracic aorta normal in course and caliber.Moderate  atherosclerosis is present, including calcified and noncalcified  plaques along the visualized thoracoabdominal aorta and major branches.  Main pulmonary artery and its branches are normal in caliber.  Moderate coronary artery calcifications are seen. Please note,the study is not optimized for evaluation of coronary arteries.  Heart is at the top limits of normal in size. The cardiac chambers are not enlarged. There is no pericardial effusion seen.  CHEST WALL AND OSSEOUS STRUCTURES:  Chest wall is within normal limits.  No acute osseous pathology. Small sclerotic lesion in the T9  vertebral body is persistent since 2010, likely a bone island.There are no suspicious osseous lesions.Mild multilevel degenerative changes within visualized spine.  Impression  1. Few small lung nodules measuring up to 5 mm as above, likely benign. Continued annual screening with low-dose noncontrast chest CT is recommended.  2. Mild emphysema. Prior granulomatous exposure.  3. Borderline cardiomegaly.  4. Moderate coronary artery calcifications, indicating the presence of coronary artery disease. If the patient has associated symptoms recommend management as per chest pain guidelines. If the  patient is asymptomatic consider reviewing modifiable cardiovascular risk factors and managing as per guidelines for primary prevention   5. Other findings as above.    ECHO:  Echocardiogram: 6/8/22 - EF 65%, Diastolic dysfunction. RA normal size, RV normal size and function     Labs:  Lab Results   Component Value Date    WBC 11.9 (H) 10/15/2024    HGB 13.9 10/15/2024    HCT 42.8 10/15/2024    MCV 91 10/15/2024     10/15/2024       Lab Results   Component Value Date    CREATININE 1.11 (H) 10/15/2024    BUN 20 10/15/2024     10/15/2024    K 4.3 10/15/2024     10/15/2024    CO2 25 10/15/2024        Eosinophils Absolute (x10*3/uL)   Date Value   09/04/2024 0.18     Eosinophils % (%)   Date Value   09/04/2024 1.3          Assessment and Plan   Cathy Curiel is a 69 y.o. female with ERA who is a current smoker (~0.5 PPD for 49 years, ~25 total pack years.) here for follow up for COPD & Asthma. Her symptoms are very poorly controlled, and she experiences dyspnea with even minimal exertion. She is currently on Breztri that she uses twice a day and is very consistent with. She continues to use her albuterol PRN, at least twice a day in the morning and night, but usually throughout the day. Is currently continuing to smoke, but motivated to stop smoking and recently prescribed nicotine patches and lozenges by PCP; we have decided on a stop date of Monday Oct 28.    - Continue current regimen: Breztri scheduled, albuterol PRN  - Start pantoprazole daily  - Continue to encourage smoking cessation, we discussed a goal stop date of October 28 (Monday), [using patches and lozenges prescribed by PCP]  - Referral to pulmonary rehab  - She will make an appointment with sleep provider to get her CPAP fixed      Follow-up:  4-6 weeks    Patient was seen, evaluated, and discussed with attending physician, Dr. Ward.    Sanjuana Gomez MD

## 2024-10-31 ENCOUNTER — APPOINTMENT (OUTPATIENT)
Dept: RADIOLOGY | Facility: HOSPITAL | Age: 69
End: 2024-10-31
Payer: COMMERCIAL

## 2024-11-11 ENCOUNTER — APPOINTMENT (OUTPATIENT)
Dept: RADIOLOGY | Facility: HOSPITAL | Age: 69
End: 2024-11-11
Payer: COMMERCIAL

## 2024-11-11 ENCOUNTER — HOSPITAL ENCOUNTER (OUTPATIENT)
Dept: RADIOLOGY | Facility: HOSPITAL | Age: 69
Discharge: HOME | End: 2024-11-11
Payer: COMMERCIAL

## 2024-11-11 VITALS — DIASTOLIC BLOOD PRESSURE: 60 MMHG | OXYGEN SATURATION: 97 % | SYSTOLIC BLOOD PRESSURE: 108 MMHG | HEART RATE: 68 BPM

## 2024-11-11 DIAGNOSIS — R07.9 CHEST PAIN, UNSPECIFIED TYPE: ICD-10-CM

## 2024-11-11 DIAGNOSIS — R07.89 CHEST HEAVINESS: ICD-10-CM

## 2024-11-11 PROCEDURE — 75574 CT ANGIO HRT W/3D IMAGE: CPT

## 2024-11-11 PROCEDURE — 2500000004 HC RX 250 GENERAL PHARMACY W/ HCPCS (ALT 636 FOR OP/ED): Performed by: INTERNAL MEDICINE

## 2024-11-11 PROCEDURE — 2550000001 HC RX 255 CONTRASTS: Performed by: INTERNAL MEDICINE

## 2024-11-11 PROCEDURE — 2500000001 HC RX 250 WO HCPCS SELF ADMINISTERED DRUGS (ALT 637 FOR MEDICARE OP): Performed by: INTERNAL MEDICINE

## 2024-11-11 PROCEDURE — 75574 CT ANGIO HRT W/3D IMAGE: CPT | Performed by: INTERNAL MEDICINE

## 2024-11-11 RX ORDER — METOPROLOL TARTRATE 1 MG/ML
5 INJECTION, SOLUTION INTRAVENOUS ONCE AS NEEDED
Status: DISCONTINUED | OUTPATIENT
Start: 2024-11-11 | End: 2024-11-12 | Stop reason: HOSPADM

## 2024-11-11 RX ORDER — METOPROLOL TARTRATE 100 MG/1
100 TABLET ORAL ONCE
Status: DISCONTINUED | OUTPATIENT
Start: 2024-11-11 | End: 2024-11-12 | Stop reason: HOSPADM

## 2024-11-11 RX ORDER — METOPROLOL TARTRATE 100 MG/1
100 TABLET ORAL ONCE AS NEEDED
Status: COMPLETED | OUTPATIENT
Start: 2024-11-11 | End: 2024-11-11

## 2024-11-11 RX ORDER — NITROGLYCERIN 0.4 MG/1
0.8 TABLET SUBLINGUAL ONCE
Status: COMPLETED | OUTPATIENT
Start: 2024-11-11 | End: 2024-11-11

## 2024-11-11 RX ORDER — METOPROLOL TARTRATE 1 MG/ML
5 INJECTION, SOLUTION INTRAVENOUS ONCE AS NEEDED
Status: COMPLETED | OUTPATIENT
Start: 2024-11-11 | End: 2024-11-11

## 2024-11-11 RX ORDER — METOPROLOL TARTRATE 1 MG/ML
5 INJECTION, SOLUTION INTRAVENOUS ONCE
Status: COMPLETED | OUTPATIENT
Start: 2024-11-11 | End: 2024-11-11

## 2024-11-11 RX ORDER — LORAZEPAM 2 MG/ML
0.5 INJECTION INTRAMUSCULAR EVERY 5 MIN PRN
Status: DISCONTINUED | OUTPATIENT
Start: 2024-11-11 | End: 2024-11-12 | Stop reason: HOSPADM

## 2024-11-11 ASSESSMENT — PAIN SCALES - GENERAL: PAINLEVEL_OUTOF10: 0 - NO PAIN

## 2024-11-11 ASSESSMENT — PAIN - FUNCTIONAL ASSESSMENT: PAIN_FUNCTIONAL_ASSESSMENT: 0-10

## 2024-11-12 ENCOUNTER — APPOINTMENT (OUTPATIENT)
Dept: ORTHOPEDIC SURGERY | Facility: HOSPITAL | Age: 69
End: 2024-11-12
Payer: COMMERCIAL

## 2024-11-14 ENCOUNTER — APPOINTMENT (OUTPATIENT)
Dept: RADIOLOGY | Facility: HOSPITAL | Age: 69
End: 2024-11-14
Payer: COMMERCIAL

## 2024-11-15 ENCOUNTER — APPOINTMENT (OUTPATIENT)
Dept: ORTHOPEDIC SURGERY | Facility: HOSPITAL | Age: 69
End: 2024-11-15
Payer: COMMERCIAL

## 2024-11-18 DIAGNOSIS — D64.9 ANEMIA, UNSPECIFIED TYPE: ICD-10-CM

## 2024-11-18 DIAGNOSIS — R14.0 BLOATING: Primary | ICD-10-CM

## 2024-11-18 RX ORDER — POLYETHYLENE GLYCOL 3350, SODIUM SULFATE ANHYDROUS, SODIUM BICARBONATE, SODIUM CHLORIDE, POTASSIUM CHLORIDE 236; 22.74; 6.74; 5.86; 2.97 G/4L; G/4L; G/4L; G/4L; G/4L
4000 POWDER, FOR SOLUTION ORAL ONCE
Qty: 4000 ML | Refills: 0 | Status: SHIPPED | OUTPATIENT
Start: 2024-11-18 | End: 2024-11-18

## 2024-11-18 NOTE — PROGRESS NOTES
Bowel preparation has been prescribed for patient's upcoming colonoscopy procedure.    Maxwell Flores MD  Gastroenterology

## 2024-11-21 ENCOUNTER — TELEPHONE (OUTPATIENT)
Dept: CARDIOLOGY | Facility: HOSPITAL | Age: 69
End: 2024-11-21
Payer: COMMERCIAL

## 2024-11-21 ENCOUNTER — ANESTHESIA EVENT (OUTPATIENT)
Dept: GASTROENTEROLOGY | Facility: HOSPITAL | Age: 69
End: 2024-11-21

## 2024-11-21 NOTE — TELEPHONE ENCOUNTER
----- Message from Galina Berumen sent at 11/19/2024  9:32 AM EST -----  Result notification:  Your recent cardiac CT scan showed small plaques in the heart arteries but no significant blockages.  At present there is no definite evidence for a heart cause for your chest tightness.  You should continue your current medications including rosuvastatin.  Efforts to quit smoking are again strongly encouraged.

## 2024-11-21 NOTE — TELEPHONE ENCOUNTER
Called patient and let her know results of testing. Patient stated she would start her nicotine patches next week.

## 2024-11-21 NOTE — ANESTHESIA PREPROCEDURE EVALUATION
Patient: Cathy Curiel    Procedure Information       Date/Time: 11/22/24 0830    Scheduled providers: Maxwell Flores MD    Procedure: COLONOSCOPY    Location: [facility]            Relevant Problems   Cardiac   (+) Hyperlipidemia   (+) Hypertension      Pulmonary   (+) Asthma   (+) Centrilobular emphysema (Multi)   (+) Chronic obstructive pulmonary disease, unspecified COPD type (Multi)   (+) Moderate persistent asthma      Neuro   (+) Lumbosacral neuritis      Endocrine   (+) Controlled type 2 diabetes mellitus without complication, without long-term current use of insulin (Multi)      Hematology   (+) Anemia   (+) Iron deficiency anemia   (+) Microcytic anemia      Musculoskeletal   (+) Degeneration of intervertebral disc of lumbar region   (+) Degeneration of intervertebral disc of lumbosacral region   (+) Herniated lumbar intervertebral disc   (+) Primary osteoarthritis of left knee   (+) Spondylosis of lumbosacral joint without myelopathy      HEENT   (+) Acute sinusitis   (+) Glaucoma   (+) Seasonal allergies      ID   (+) Tinea corporis   (+) Upper respiratory tract infection      Skin   (+) Erythematous eczema      Respiratory   (+) Obstructive sleep apnea      Hematology and Neoplasia   (+) Spindle cell sarcoma (Multi)     69 y.o. female with ERA who is a current smoker (~0.5 PPD for 49 years, ~25 total pack years.) HTN, DLD, DM2, SCC (LUE).   On 10/23/24 her symptoms were very poorly controlled, and she experiences dyspnea with even minimal exertion.   Clinical information reviewed:                 CCTA 9/3/24  IMPRESSION:  1. Nonobstructive coronary artery disease.  2. The LAD, mid-distal RCA proximal LCx have focal calcified plaque  with minimal stenosis (<25%).  3. Right dominant coronary artery system.  4. Moderately elevated coronary artery calcium score of 345*.  5. Mild mitral annular calcification.  6. 5 mm noncalcified pulmonary nodule in the left lower lobe    ECHO  2022  CONCLUSIONS:   1. The left ventricular systolic function is normal with a 65% estimated ejection fraction.   2. Spectral Doppler shows an impaired relaxation pattern of left ventricular diastolic filling.   3. The estimated PASP is 28 mmHg.  NPO Detail:  No data recorded     PHYSICAL EXAM    Anesthesia Plan

## 2024-11-22 ENCOUNTER — APPOINTMENT (OUTPATIENT)
Dept: GASTROENTEROLOGY | Facility: HOSPITAL | Age: 69
End: 2024-11-22
Payer: COMMERCIAL

## 2024-11-22 ENCOUNTER — ANESTHESIA (OUTPATIENT)
Dept: GASTROENTEROLOGY | Facility: HOSPITAL | Age: 69
End: 2024-11-22
Payer: COMMERCIAL

## 2024-11-22 DIAGNOSIS — L20.89 OTHER ATOPIC DERMATITIS: ICD-10-CM

## 2024-11-22 RX ORDER — HYDROXYZINE PAMOATE 25 MG/1
25 CAPSULE ORAL 3 TIMES DAILY PRN
Qty: 270 CAPSULE | Refills: 0 | Status: SHIPPED | OUTPATIENT
Start: 2024-11-22 | End: 2025-02-20

## 2024-11-25 DIAGNOSIS — E78.5 HYPERLIPIDEMIA, UNSPECIFIED HYPERLIPIDEMIA TYPE: ICD-10-CM

## 2024-11-25 DIAGNOSIS — L30.9: ICD-10-CM

## 2024-11-25 DIAGNOSIS — E11.9 CONTROLLED TYPE 2 DIABETES MELLITUS WITHOUT COMPLICATION, WITHOUT LONG-TERM CURRENT USE OF INSULIN (MULTI): ICD-10-CM

## 2024-11-25 RX ORDER — METFORMIN HYDROCHLORIDE 500 MG/1
500 TABLET ORAL
Qty: 90 TABLET | Refills: 1 | OUTPATIENT
Start: 2024-11-25

## 2024-11-25 RX ORDER — ROSUVASTATIN CALCIUM 40 MG/1
40 TABLET, COATED ORAL
Qty: 90 TABLET | Refills: 1 | OUTPATIENT
Start: 2024-11-25

## 2024-11-25 NOTE — TELEPHONE ENCOUNTER
Pt called stating she would like provider to call her pharmacy to find out what orders she is in need of. Call placed to pt to make aware of need to call pharmacy r/t her meds, and then to call clinic for refill request. Pt informed nurse of need of metformin, rosuvastatin 40mg, fluticasone. Noted meds prescribed via Dr Boucher. Orders pended and routed to provider. Pt requests 90 day supply of triamcinolone ointment.

## 2024-11-26 RX ORDER — TRIAMCINOLONE ACETONIDE 1 MG/G
OINTMENT TOPICAL 2 TIMES DAILY
Qty: 454 G | Refills: 1 | OUTPATIENT
Start: 2024-11-26

## 2024-11-26 RX ORDER — BLOOD-GLUCOSE CONTROL, NORMAL
1 EACH MISCELLANEOUS DAILY
Qty: 100 EACH | Refills: 3 | Status: SHIPPED | OUTPATIENT
Start: 2024-11-26

## 2024-11-26 RX ORDER — ISOPROPYL ALCOHOL 70 ML/100ML
1 SWAB TOPICAL DAILY
Qty: 100 EACH | Refills: 2 | Status: SHIPPED | OUTPATIENT
Start: 2024-11-26

## 2024-11-26 RX ORDER — METFORMIN HYDROCHLORIDE 500 MG/1
500 TABLET ORAL
Qty: 90 TABLET | Refills: 1 | Status: SHIPPED | OUTPATIENT
Start: 2024-11-26

## 2024-11-26 RX ORDER — ROSUVASTATIN CALCIUM 40 MG/1
40 TABLET, COATED ORAL
Qty: 90 TABLET | Refills: 1 | Status: SHIPPED | OUTPATIENT
Start: 2024-11-26

## 2024-11-27 ENCOUNTER — TELEPHONE (OUTPATIENT)
Dept: PRIMARY CARE | Facility: CLINIC | Age: 69
End: 2024-11-27
Payer: COMMERCIAL

## 2024-11-27 NOTE — TELEPHONE ENCOUNTER
Call placed to pt to make aware requested refills sent EXCEPT triamcinolone d/t need to F/U with dermatology. Call placed to make pt aware. No answer, voicemail left with update.

## 2024-12-04 ENCOUNTER — OFFICE VISIT (OUTPATIENT)
Dept: PULMONOLOGY | Facility: HOSPITAL | Age: 69
End: 2024-12-04
Payer: COMMERCIAL

## 2024-12-04 VITALS
TEMPERATURE: 97.8 F | WEIGHT: 191.2 LBS | HEIGHT: 64 IN | DIASTOLIC BLOOD PRESSURE: 76 MMHG | SYSTOLIC BLOOD PRESSURE: 123 MMHG | HEART RATE: 86 BPM | OXYGEN SATURATION: 97 % | RESPIRATION RATE: 18 BRPM | BODY MASS INDEX: 32.64 KG/M2

## 2024-12-04 DIAGNOSIS — J44.9 CHRONIC OBSTRUCTIVE PULMONARY DISEASE, UNSPECIFIED COPD TYPE (MULTI): ICD-10-CM

## 2024-12-04 DIAGNOSIS — I27.21 PULMONARY ARTERIAL HYPERTENSION (MULTI): Primary | ICD-10-CM

## 2024-12-04 DIAGNOSIS — J44.1 COPD EXACERBATION (MULTI): ICD-10-CM

## 2024-12-04 PROCEDURE — 3008F BODY MASS INDEX DOCD: CPT | Performed by: STUDENT IN AN ORGANIZED HEALTH CARE EDUCATION/TRAINING PROGRAM

## 2024-12-04 PROCEDURE — 1159F MED LIST DOCD IN RCRD: CPT | Performed by: STUDENT IN AN ORGANIZED HEALTH CARE EDUCATION/TRAINING PROGRAM

## 2024-12-04 PROCEDURE — 3048F LDL-C <100 MG/DL: CPT | Performed by: STUDENT IN AN ORGANIZED HEALTH CARE EDUCATION/TRAINING PROGRAM

## 2024-12-04 PROCEDURE — 99214 OFFICE O/P EST MOD 30 MIN: CPT | Mod: GC | Performed by: STUDENT IN AN ORGANIZED HEALTH CARE EDUCATION/TRAINING PROGRAM

## 2024-12-04 PROCEDURE — 1125F AMNT PAIN NOTED PAIN PRSNT: CPT | Performed by: STUDENT IN AN ORGANIZED HEALTH CARE EDUCATION/TRAINING PROGRAM

## 2024-12-04 PROCEDURE — 3078F DIAST BP <80 MM HG: CPT | Performed by: STUDENT IN AN ORGANIZED HEALTH CARE EDUCATION/TRAINING PROGRAM

## 2024-12-04 PROCEDURE — 3044F HG A1C LEVEL LT 7.0%: CPT | Performed by: STUDENT IN AN ORGANIZED HEALTH CARE EDUCATION/TRAINING PROGRAM

## 2024-12-04 PROCEDURE — 3074F SYST BP LT 130 MM HG: CPT | Performed by: STUDENT IN AN ORGANIZED HEALTH CARE EDUCATION/TRAINING PROGRAM

## 2024-12-04 PROCEDURE — 99214 OFFICE O/P EST MOD 30 MIN: CPT | Performed by: STUDENT IN AN ORGANIZED HEALTH CARE EDUCATION/TRAINING PROGRAM

## 2024-12-04 RX ORDER — LEVOFLOXACIN 750 MG/1
750 TABLET ORAL DAILY
Qty: 10 TABLET | Refills: 0 | Status: SHIPPED | OUTPATIENT
Start: 2024-12-04 | End: 2024-12-14

## 2024-12-04 RX ORDER — OSELTAMIVIR PHOSPHATE 75 MG/1
75 CAPSULE ORAL EVERY 24 HOURS
Qty: 5 CAPSULE | Refills: 0 | Status: SHIPPED | OUTPATIENT
Start: 2024-12-04 | End: 2024-12-09

## 2024-12-04 RX ORDER — ALBUTEROL SULFATE 0.83 MG/ML
2.5 SOLUTION RESPIRATORY (INHALATION) 4 TIMES DAILY PRN
Qty: 540 ML | Refills: 3 | Status: SHIPPED | OUTPATIENT
Start: 2024-12-04 | End: 2025-12-04

## 2024-12-04 RX ORDER — ALBUTEROL SULFATE 90 UG/1
1-2 INHALANT RESPIRATORY (INHALATION) EVERY 6 HOURS PRN
Qty: 54 G | Refills: 3 | Status: SHIPPED | OUTPATIENT
Start: 2024-12-04 | End: 2025-12-04

## 2024-12-04 RX ORDER — PREDNISONE 20 MG/1
40 TABLET ORAL DAILY
Qty: 10 TABLET | Refills: 0 | Status: SHIPPED | OUTPATIENT
Start: 2024-12-04 | End: 2024-12-09

## 2024-12-04 ASSESSMENT — PAIN SCALES - GENERAL: PAINLEVEL_OUTOF10: 6

## 2024-12-04 NOTE — PROGRESS NOTES
Department of Medicine I Division of Pulmonary, Critical Care, and Sleep Medicine   [address] [place] [address] [address] [place] [address]  Phone: [phone]  Fax: [phone]    History of Present Illness   Cathy Curiel is a 69 y.o. female who is a current smoker (~0.5 PPD for 49 years, ~25 total pack years.) here for follow up for COPD & Asthma.    She was previously seen in pulmonary clinic in 9/2024. At that time she was evaluated for COPD, with worsening symptoms of dyspnea on exertion, on Breztri and PRN Albuterol, and using her albuterol about 6 times a day. She had also been recently seen in the ED for shortness of breath treated with a 5 day course of steroids. She continued to have poorly controlled symptoms at the visit, and referral was made to pulmonary rehabilitation. She also reported that her breathing is very poorly controlled and she feels very short of breath a majority of the time, with some improvement after the prednisone treatment from the ED. She also endorsed a baseline cough and symptoms of acid reflux. Was continuing to smoke but motivated to quit (was recently prescribed nicotine patches and lozenges by PCP).    She was most recently seen in pulmonary clinic in late October 2024, and reported persistence in her poorly controlled symptoms of her COPD, though reported consistent use of her Breztri, use of daily pantoprazole, twice a day flonase, and was also albuterol about every morning and night, and less during the day. She unfortunately reported that she was continuing to smoke at lower rate (now smoking about 1/2 pack per day rather than full pack), and had not started pulmonary rehab.    Today she reports persistence of symptoms, especially a worsening of her symptoms in the last few days with a cough productive of yellow sputum that is different from her normal baseline (clear sputum production). She reports larger amount of mucous production than normal, and some  associated malaise and cold shakes at home. She has intermittent wheezing at baseline but feels that it is worse now. She tested herself for COVID which is negative, and she did get a flu shot this season.     She reports that she continues to be consistent with her Breztri twice a day, and uses albuterol 3-4 times a day at least (if she goes out or does a lot of activity will use it closer to 6 times a day). Before the worsening over the past few days, she was closer to her normal baseline. Any exertion including walking causes shortness of breath; the walk from the parking lot to the waiting room caused dyspnea that resolved with a few minutes of rest. She does report continued smoking, about 10 cigarettes a day. Her CPAP is still broken and she has still not been able to speak with her sleep provider. She is again amenable to pulmonary rehab.       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         Immunizations     Immunization History   Administered Date(s) Administered    Flu vaccine (IIV4), preservative free *Check age/dose* 10/14/2014, 10/06/2015, 10/16/2017, 10/03/2018, 10/29/2019, 11/19/2020    Flu vaccine, quadrivalent, high-dose, preservative free, age 65y+ (FLUZONE) 11/10/2021, 10/11/2023    Flu vaccine, trivalent, preservative free, HIGH-DOSE, age 65y+ (Fluzone) 10/23/2024    Influenza, Unspecified 10/19/1999, 11/30/2010, 12/08/2011    Influenza, seasonal, injectable 10/01/2009, 11/09/2016, 11/10/2021    Moderna COVID-19 vaccine, bivalent, blue cap/gray label *Check age/dose* 04/05/2023    Moderna SARS-CoV-2 Vaccination 03/26/2021, 04/23/2021, 12/20/2021    Pneumococcal polysaccharide vaccine, 23-valent, age 2 years and older (PNEUMOVAX 23) 10/14/2014     Tdap vaccine, age 7 year and older (BOOSTRIX, ADACEL) 05/28/2010, 06/08/2021       Medications and Allergies     Current Outpatient Medications   Medication Instructions    acetaminophen (Tylenol) 500 mg tablet 2 tablets, oral, As needed    albuterol 90 mcg/actuation inhaler 1-2 puffs, inhalation, Every 6 hours PRN, Every 4 to 6 hours as needed    albuterol 2.5 mg, nebulization, 4 times daily PRN    alcohol swabs (Alcohol Prep Pads) pads, medicated 1 Pad, topical (top), Daily    ALPRAZolam (XANAX) 1 mg, oral, Once PRN Procedure, Take 1 hour prior to planned MRI.    betamethasone valerate (Valisone) 0.1 % ointment Topical, 2 times daily, To raised rough skin until smooth    blood sugar diagnostic (OneTouch Ultra Test) strip TEST ONCE DAILY    budesonide-glycopyr-formoterol (Breztri Aerosphere) 160-9-4.8 mcg/actuation HFA aerosol inhaler 2 puffs, inhalation, 2 times daily    dicyclomine (BENTYL) 20 mg, oral, 4 times daily    diphenhydrAMINE (BENADryl) 25 mg capsule Take one 25mg tablet one hour prior to your cardiac CT scan    fluticasone (Flonase) 50 mcg/actuation nasal spray 1 spray, Each Nostril, Daily, Shake gently. Before first use, prime pump. After use, clean tip and replace cap.    FreeStyle glucose monitoring (OneTouch Ultra2 Meter) kit 1 each, miscellaneous, Daily    hydrOXYzine pamoate (VISTARIL) 25 mg, oral, 3 times daily PRN    lancets (Onetouch Delica Safety Lancet) 30 gauge misc 1 Lancet, miscellaneous, Daily    losartan-hydrochlorothiazide (Hyzaar) 100-25 mg tablet 1 tablet, oral, Daily    metFORMIN (GLUCOPHAGE) 500 mg, oral, Every Day    metoprolol tartrate (LOPRESSOR) 100 mg, oral, Once PRN Procedure    mv,rex,min/iron/folic acid/lut (COMPLETE MULTI ORAL) 1 capsule, oral, Daily    pantoprazole (PROTONIX) 40 mg, oral, Daily before breakfast, Do not crush, chew, or split.    predniSONE (Deltasone) 50 mg tablet Take one 50mg tablet 13 hours, 7 hours, and 1 hour prior to your cardiac CT scan     "rosuvastatin (CRESTOR) 40 mg, oral, Every Day    triamcinolone (Kenalog) 0.1 % ointment Topical, 2 times daily, Apply gently to the affected area three times daily to four times daily        Allergies:  Azithromycin, Meloxicam, Benzonatate, Etodolac, Iodinated contrast media, Latex, Penicillin, and Penicillins    Social History   She reports that she has been smoking cigarettes. She started smoking about 40 years ago. She has a 40.2 pack-year smoking history. She has never used smokeless tobacco. She reports that she does not currently use alcohol. She reports that she does not use drugs.    Smoking History:   Previously a pack a day, now 10 cigarettes a day    Family History   Fam hx of sarcoidosis     Surgical History   She has a past surgical history that includes Other surgical history (04/04/2022); CT guided percutaneous biopsy bone deep (08/29/2022); and Hysterectomy.    Physical Exam         9/11/2024    10:15 AM 9/11/2024     3:35 PM 10/10/2024     3:01 PM 10/23/2024     2:08 PM 11/11/2024     9:40 AM 11/11/2024    10:38 AM 11/11/2024    10:41 AM   Vitals   Systolic  102 98 110 161 124 108   Diastolic  69 64 72 76 73 60   BP Location  Left arm        Heart Rate  99 97 98 67 70 68   Temp   35.7 °C (96.2 °F) 36.4 °C (97.6 °F)      Height 1.626 m (5' 4\")         Weight (lb) 199 191 194.5 194      BMI 34.16 kg/m2 32.79 kg/m2 33.39 kg/m2 33.3 kg/m2      BSA (m2) 2.02 m2 1.98 m2 2 m2 1.99 m2      Visit Report Report Report Report Report           Physical Exam  Constitutional: alert oriented not in acute distress, but some dyspnea after having walked from waiting room  HEENT: normocephalic atraumatic mmm  CV: normal s1 and s2 and rhythm/rate  Respiratory: clear to auscultation bilaterally  Abdomen: soft non tender non distended    Results   Pulmonary Function Tests:  2/8/24: - FEV1/FVC 0.51/ FEV1 1.56 (69% - almost BD resp)/ FVC 2.95 (102%)      8/4/22: FEV1/FVC 0.55/ FEV1 1.67 (82%)/ FVC 3.01 (116%)/ TLC 5.11 " (115%)/ DLCO 62% -- FEF 25/75 27%   6 MWTs: 8/4/22 - 404m (,) 97 -> 95% on RA, DARCIE 0     FENO: 1/9/24 - 8ppb        Chest Radiograph:  XR chest 2 views 09/04/2024  Impression  No acute cardiopulmonary process.  MACRO:  None    Chest CT Scan:  CT LUNG SCREENING LOW DOSE 06/24/2022  FINDINGS:  LUNGS AND AIRWAYS:  The trachea and central airways are patent. No endobronchial lesion is seen.  Mild upper lobe predominant centrilobular and paraseptal emphysema is seen. In addition, there is mild mosaic lung attenuation, which may be seen in the setting of small airway or small vessel disease. There is mild bibasilar dependent atelectasis. No other focal consolidation, pleural effusion or pneumothorax.  There is a 5-mm solid noncalcified lung nodule in the left upper lobe (axial image 103 of 294), overall stable since the prior study from 2010, consistent with benign etiology given long-term stability.  Additional small 3-mm subpleural solid lung nodule is seen in the posterior left lower lobe (axial image 231 of 294), not identified with certainty on the prior study. Another 3-mm subpleural ground-glass nodule is seen at the periphery of the left upper lobe (axial image 93 of 294)  MEDIASTINUM AND ASHLEY, LOWER NECK AND AXILLA:  The visualized thyroid gland is enlarged and heterogeneous, overall stable.  No evidence of thoracic lymphadenopathy by CT criteria. Multiple small calcified hilar and mediastinal lymph nodes are consistent with prior granulomatous exposure.  Esophagus appears within normal limits as seen.  HEART AND VESSELS:  The thoracic aorta normal in course and caliber.Moderate  atherosclerosis is present, including calcified and noncalcified  plaques along the visualized thoracoabdominal aorta and major branches.  Main pulmonary artery and its branches are normal in caliber.  Moderate coronary artery calcifications are seen. Please note,the study is not optimized for evaluation of coronary  arteries.  Heart is at the top limits of normal in size. The cardiac chambers are not enlarged. There is no pericardial effusion seen.  CHEST WALL AND OSSEOUS STRUCTURES:  Chest wall is within normal limits.  No acute osseous pathology. Small sclerotic lesion in the T9  vertebral body is persistent since 2010, likely a bone island.There are no suspicious osseous lesions.Mild multilevel degenerative changes within visualized spine.  Impression  1. Few small lung nodules measuring up to 5 mm as above, likely benign. Continued annual screening with low-dose noncontrast chest CT is recommended.  2. Mild emphysema. Prior granulomatous exposure.  3. Borderline cardiomegaly.  4. Moderate coronary artery calcifications, indicating the presence of coronary artery disease. If the patient has associated symptoms recommend management as per chest pain guidelines. If the patient is asymptomatic consider reviewing modifiable cardiovascular risk factors and managing as per guidelines for primary prevention   5. Other findings as above.    ECHO:  Echocardiogram: 6/8/22 - EF 65%, Diastolic dysfunction. RA normal size, RV normal size and function     Labs:  Lab Results   Component Value Date    WBC 11.9 (H) 10/15/2024    HGB 13.9 10/15/2024    HCT 42.8 10/15/2024    MCV 91 10/15/2024     10/15/2024       Lab Results   Component Value Date    CREATININE 1.11 (H) 10/15/2024    BUN 20 10/15/2024     10/15/2024    K 4.3 10/15/2024     10/15/2024    CO2 25 10/15/2024        Eosinophils Absolute (x10*3/uL)   Date Value   09/04/2024 0.18     Eosinophils % (%)   Date Value   09/04/2024 1.3          Assessment and Plan   Cathy Curiel is a 69 y.o. female with ERA who is a current smoker (~0.5 PPD for 49 years, ~25 total pack years.) here for follow up for COPD & Asthma. Her symptoms are very poorly controlled, and she experiences dyspnea with even minimal exertion. She is currently on Breztri that she uses twice a day  and is very consistent with. She continues to use her albuterol PRN, at least twice a day in the morning and night, but usually throughout the day 4-6 times. Is currently continuing to smoke, working on smoking cessation and amenable to pulmonary rehab.    Given her acute symptoms at this appointment on the baseline of her poorly controlled disease, sent home with a course of levofloxacin to complete now.     - Continue current regimen: Breztri scheduled, albuterol PRN  - Start and complete course of levofloxacin   - Sent home with as needed prednisone burst and course of tamiflu in case of exposure over the holiday season (given poor baseline control of her symptoms)   - Continue pantoprazole daily  - Continue to encourage smoking cessation, we discussed using the imminent arrival of her great grandbaby as motivation [using patches and lozenges prescribed by PCP]  - Referral to pulmonary rehab  - She will make an appointment with sleep provider to get her CPAP fixed (made referral)     Follow-up:  3 months    Patient was seen, evaluated, and discussed with attending physician, Dr. Jose Guadalupe Gomez MD

## 2024-12-04 NOTE — PATIENT INSTRUCTIONS
It was a pleasure to see you today in clinic!     Today we discussed your COPD, your current respiratory symptoms, and your work to stop smoking.     The plan going forward is as follows:   Continue the following medications:   - Breztri BID  - Albuterol as needed    Start the following treatments:   - please take 10 day course of levoquin (to help with your respiratory infection)  - please respond to pulmonary rehab team to start your sessions    Follow-up with other practitioners and referrals given:  - please reach out to your sleep provider to work on getting your CPAP fixed    I will also send you home with a course of steroids (prednisone) in case of worsening COPD symptoms and a course of flu medication (tamiflu) in case you are exposed this holiday season.    Please follow up with me in: 3 months    If you have any further questions feel free to call my office at [phone] (choose #2 to reach a pulmonary nurse) and please allow 1-2 business days for a response.     If you have any scheduling needs feel free to call [phone] (for breathing or walking test), [phone] (for EKG's, echocardiograms or cardiopulmonary stress test), and [phone] (for radiology tests such as CT scan, MRIs and nuclear medicine tests).    Sanjuana Gomez  Pulmonary and Critical Care Fellow     [address] [place] [address]

## 2024-12-27 ENCOUNTER — APPOINTMENT (OUTPATIENT)
Dept: PRIMARY CARE | Facility: CLINIC | Age: 69
End: 2024-12-27
Payer: COMMERCIAL

## 2024-12-27 VITALS
RESPIRATION RATE: 18 BRPM | HEIGHT: 64 IN | BODY MASS INDEX: 32.78 KG/M2 | WEIGHT: 192 LBS | SYSTOLIC BLOOD PRESSURE: 116 MMHG | TEMPERATURE: 97.5 F | OXYGEN SATURATION: 97 % | DIASTOLIC BLOOD PRESSURE: 80 MMHG | HEART RATE: 85 BPM

## 2024-12-27 DIAGNOSIS — J01.10 ACUTE NON-RECURRENT FRONTAL SINUSITIS: ICD-10-CM

## 2024-12-27 DIAGNOSIS — E11.9 CONTROLLED TYPE 2 DIABETES MELLITUS WITHOUT COMPLICATION, WITHOUT LONG-TERM CURRENT USE OF INSULIN (MULTI): ICD-10-CM

## 2024-12-27 DIAGNOSIS — J44.9 CHRONIC OBSTRUCTIVE PULMONARY DISEASE, UNSPECIFIED COPD TYPE (MULTI): Primary | ICD-10-CM

## 2024-12-27 DIAGNOSIS — J44.1 COPD EXACERBATION (MULTI): ICD-10-CM

## 2024-12-27 PROCEDURE — 99213 OFFICE O/P EST LOW 20 MIN: CPT | Mod: GE

## 2024-12-27 PROCEDURE — 1126F AMNT PAIN NOTED NONE PRSNT: CPT

## 2024-12-27 PROCEDURE — 3008F BODY MASS INDEX DOCD: CPT

## 2024-12-27 PROCEDURE — 3079F DIAST BP 80-89 MM HG: CPT

## 2024-12-27 PROCEDURE — 99213 OFFICE O/P EST LOW 20 MIN: CPT

## 2024-12-27 PROCEDURE — 3074F SYST BP LT 130 MM HG: CPT

## 2024-12-27 RX ORDER — SODIUM CHLORIDE 0.65 %
1 AEROSOL, SPRAY (ML) NASAL AS NEEDED
Qty: 30 ML | Refills: 0 | Status: SHIPPED | OUTPATIENT
Start: 2024-12-27 | End: 2025-12-27

## 2024-12-27 RX ORDER — METFORMIN HYDROCHLORIDE 500 MG/1
500 TABLET, EXTENDED RELEASE ORAL
Qty: 100 TABLET | Refills: 3 | Status: SHIPPED | OUTPATIENT
Start: 2024-12-27 | End: 2026-01-31

## 2024-12-27 RX ORDER — DOXYCYCLINE 100 MG/1
100 CAPSULE ORAL 2 TIMES DAILY
Qty: 14 CAPSULE | Refills: 0 | Status: SHIPPED | OUTPATIENT
Start: 2024-12-27 | End: 2025-01-03

## 2024-12-27 ASSESSMENT — PATIENT HEALTH QUESTIONNAIRE - PHQ9
2. FEELING DOWN, DEPRESSED OR HOPELESS: NOT AT ALL
1. LITTLE INTEREST OR PLEASURE IN DOING THINGS: NOT AT ALL
SUM OF ALL RESPONSES TO PHQ9 QUESTIONS 1 AND 2: 0

## 2024-12-27 ASSESSMENT — PAIN SCALES - GENERAL: PAINLEVEL_OUTOF10: 0-NO PAIN

## 2024-12-27 ASSESSMENT — ENCOUNTER SYMPTOMS
DEPRESSION: 0
LOSS OF SENSATION IN FEET: 0
OCCASIONAL FEELINGS OF UNSTEADINESS: 0
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
## 2025-01-02 NOTE — PROGRESS NOTES
ERP at bedside to update patient on plan of care   I saw and evaluated the patient. I personally obtained the key and critical portions of the history and physical exam or was physically present for key and critical portions performed by the resident/fellow. I reviewed the resident/fellow's documentation and discussed the patient with the resident/fellow. I agree with the resident/fellow's medical decision making as documented in the note.  Allergic to macrolide and penicillin.    Annie Navarro MD

## 2025-02-24 ENCOUNTER — OFFICE VISIT (OUTPATIENT)
Facility: HOSPITAL | Age: 70
End: 2025-02-24
Payer: COMMERCIAL

## 2025-02-24 VITALS
WEIGHT: 190 LBS | DIASTOLIC BLOOD PRESSURE: 79 MMHG | HEIGHT: 64 IN | SYSTOLIC BLOOD PRESSURE: 138 MMHG | BODY MASS INDEX: 32.44 KG/M2 | HEART RATE: 101 BPM | OXYGEN SATURATION: 96 % | TEMPERATURE: 97 F

## 2025-02-24 DIAGNOSIS — R39.9 SYMPTOMS OF URINARY TRACT INFECTION: ICD-10-CM

## 2025-02-24 DIAGNOSIS — R21 RASH: Primary | ICD-10-CM

## 2025-02-24 DIAGNOSIS — L20.89 OTHER ATOPIC DERMATITIS: ICD-10-CM

## 2025-02-24 LAB
POC APPEARANCE, URINE: CLEAR
POC BILIRUBIN, URINE: NEGATIVE
POC BLOOD, URINE: ABNORMAL
POC COLOR, URINE: YELLOW
POC GLUCOSE, URINE: NEGATIVE MG/DL
POC KETONES, URINE: NEGATIVE MG/DL
POC LEUKOCYTES, URINE: NEGATIVE
POC NITRITE,URINE: NEGATIVE
POC PH, URINE: 6 PH
POC PROTEIN, URINE: NEGATIVE MG/DL
POC SPECIFIC GRAVITY, URINE: 1.02
POC UROBILINOGEN, URINE: 0.2 EU/DL

## 2025-02-24 PROCEDURE — 3075F SYST BP GE 130 - 139MM HG: CPT | Performed by: PODIATRIST

## 2025-02-24 PROCEDURE — 81003 URINALYSIS AUTO W/O SCOPE: CPT | Performed by: PODIATRIST

## 2025-02-24 PROCEDURE — 3008F BODY MASS INDEX DOCD: CPT | Performed by: PODIATRIST

## 2025-02-24 PROCEDURE — 99214 OFFICE O/P EST MOD 30 MIN: CPT | Mod: GC | Performed by: PODIATRIST

## 2025-02-24 PROCEDURE — 1159F MED LIST DOCD IN RCRD: CPT | Performed by: PODIATRIST

## 2025-02-24 PROCEDURE — 99214 OFFICE O/P EST MOD 30 MIN: CPT | Performed by: PODIATRIST

## 2025-02-24 PROCEDURE — 1125F AMNT PAIN NOTED PAIN PRSNT: CPT | Performed by: PODIATRIST

## 2025-02-24 PROCEDURE — 3078F DIAST BP <80 MM HG: CPT | Performed by: PODIATRIST

## 2025-02-24 RX ORDER — BETAMETHASONE VALERATE 1.2 MG/G
OINTMENT TOPICAL 2 TIMES DAILY
Qty: 45 G | Refills: 5 | Status: SHIPPED | OUTPATIENT
Start: 2025-02-24 | End: 2025-04-25

## 2025-02-24 ASSESSMENT — ENCOUNTER SYMPTOMS
FATIGUE: 0
EYE PAIN: 0
JOINT SWELLING: 0
DYSURIA: 0
EYE DISCHARGE: 0
DIAPHORESIS: 0
POLYPHAGIA: 0
ADENOPATHY: 0
FEVER: 0
SHORTNESS OF BREATH: 0
DIARRHEA: 0
DIFFICULTY URINATING: 0
CONSTIPATION: 0
AGITATION: 0
BLOOD IN STOOL: 0
CHILLS: 0
COUGH: 0
ARTHRALGIAS: 0
UNEXPECTED WEIGHT CHANGE: 0
ABDOMINAL DISTENTION: 0
CHEST TIGHTNESS: 0
EYE REDNESS: 0
SINUS PRESSURE: 0
FREQUENCY: 1
EYE ITCHING: 0
ABDOMINAL PAIN: 0
BACK PAIN: 0
DIZZINESS: 0
LIGHT-HEADEDNESS: 0
POLYDIPSIA: 0
FLANK PAIN: 1
CONFUSION: 0
HEADACHES: 0

## 2025-02-24 ASSESSMENT — PAIN SCALES - GENERAL: PAINLEVEL_OUTOF10: 9

## 2025-02-24 NOTE — PATIENT INSTRUCTIONS
Thank you for coming in to see us today, Ms. Cathy Curiel! It was a pleasure managing your health together.    Today in clinic, we discussed rash, concern for urinary tract infection.    If we ordered bloodwork today, you can get this done at ANY  location and the results will come to me directly. The [facility] here at [facility] are walk-in (no appointment needed); [facility]'s hours are M-F 6:30a-6p and Sat 8a-12p.    Please call [phone] to schedule your appointment.    If you have any questions/concerns, you can always use [employer] to message me directly. Or if you prefer, you can call the office at [phone]; if you leave a message, the office staff should translate this to a message in my inbox.    I'm looking forward to seeing you back in clinic in 1-2 months to discuss Rash.    Best,  Dr. Burgos

## 2025-02-24 NOTE — PROGRESS NOTES
Subjective   Patient ID: Cathy Curiel is a 69 y.o. 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 ).    Rash  Pertinent negatives include no congestion, cough, diarrhea, eye pain, fatigue, fever or shortness of breath.      #Pelvic pain  #Concern for UTI  -She has increased pressure, increased frequency. No dysuria, no nausea, no fever. No odor in urine. Not sexually active. Symptoms have been going on for 2 weeks.     #Rash posterior back, thighs  -The rash been going on for 1 month. It itches. She said it is spreading. Describes it as itching, burning and pain. Has used cortisone every other day. No recent detergents changes or lotions, soaps. Has not used betamethasone 0.1%. Has used Atarax, with minimal relief. When she sweats, it itches. No recent viral infection, fever, chills, nausea, vomiting. No sick contacts. No recent travel. She does say her dog has an oral rash.      #Osteoarthritis of Right hand  -Would like treatment. Has a history of allergy of Etoloac.     Review of Systems   Constitutional:  Negative for chills, diaphoresis, fatigue, fever and unexpected weight change.   HENT:  Negative for congestion, ear pain and sinus pressure.    Eyes:  Negative for pain, discharge, redness and itching.   Respiratory:  Negative for cough, chest tightness and shortness of breath.    Cardiovascular:  Negative for chest pain.   Gastrointestinal:  Negative for abdominal distention, abdominal pain, blood in stool, constipation and diarrhea.   Endocrine: Negative for cold intolerance, heat intolerance, polydipsia and polyphagia.   Genitourinary:  Positive for flank pain and frequency. Negative for difficulty urinating, dysuria, pelvic pain and urgency.   Musculoskeletal:  Negative for arthralgias, back pain and joint swelling.   Skin:  Positive  "for rash.   Neurological:  Negative for dizziness, syncope, light-headedness and headaches.   Hematological:  Negative for adenopathy.   Psychiatric/Behavioral:  Negative for agitation, behavioral problems and confusion.        Objective   /79 (BP Location: Right arm, Patient Position: Sitting)   Pulse 101   Temp 36.1 °C (97 °F) (Temporal)   Ht 1.626 m (5' 4\")   Wt 86.2 kg (190 lb)   SpO2 96%   BMI 32.61 kg/m²     Physical Exam  Constitutional:       General: She is not in acute distress.     Appearance: Normal appearance.   HENT:      Head: Normocephalic and atraumatic.      Nose: Nose normal.      Mouth/Throat:      Mouth: Mucous membranes are moist.   Eyes:      Extraocular Movements: Extraocular movements intact.      Conjunctiva/sclera: Conjunctivae normal.   Cardiovascular:      Rate and Rhythm: Normal rate and regular rhythm.      Heart sounds: Normal heart sounds.   Pulmonary:      Effort: Pulmonary effort is normal. No respiratory distress.      Breath sounds: Normal breath sounds. No wheezing.   Abdominal:      General: Bowel sounds are normal. There is no distension.      Palpations: Abdomen is soft.      Tenderness: There is no abdominal tenderness. There is no guarding.   Musculoskeletal:         General: Normal range of motion.      Cervical back: Normal range of motion.   Skin:     General: Skin is warm.      Findings: Rash present. Rash is papular and urticarial.      Comments: Well defined papular round red, scaly with dry appearance lesions without drainage measuring 4.0 x 4.0 cm on back, posterior thigh and anterior thigh.    Neurological:      General: No focal deficit present.      Mental Status: She is alert. Mental status is at baseline.   Psychiatric:         Mood and Affect: Mood normal.         Behavior: Behavior normal.              Assessment/Plan     Cathy Curiel is a 69 y.o. female with PMH T2DM, asthma/COPD, tobacco dependence, low-grade spindle cell sarcoma resected " from her left distal arm (resected 10/10/2022), atopic dermatis, HTN, diverticulosis who presents for Rash (Possible UTI. ).    Diagnoses and all orders for this visit:  Symptoms of urinary tract infection  -      Patient presents with symptoms/concerns of UTI. No fever, no dysuria. Does endorse  Ordered POCT UA and negative for UTI. However, trace blood. May repeat UA at next visit.   -      POCT UA Automated manually resulted  Rash  Other atopic dermatitis  -     Patient has a history of atopic dermatitis. However, presents with new, worsening rash over past month. Has tried cortisone cream and Atarax for pruritus with minimal improvement. Pictures of rash on back above. On physical exam well defined papular round red, scaly with dry appearance lesions without drainage measuring 4.0 x 4.0 cm on back, posterior thigh and anterior thigh. Ddx includes atopic dermatitis, nummular eczema, allergic contact dermatitis, tinea corporis, xerosis, psoriasis vs lichen simplex chronicus. Encouraged patient to reach out to her dermatologist to get at next available appointment. Encouraged patient to wash hands, keep rash covered and wear a mask when taking care of her  grandchild. Renewed betamethasone.   -     betamethasone valerate (Valisone) 0.1 % ointment; Apply topically 2 times a day. To raised rough skin until smooth   Return to clinic        -     In 1 month follow up rash     Patient seen and discussed with attending physician, Dr. Henderson.    Justina Burgos MD   PGY2, Family Medicine

## 2025-02-26 PROBLEM — R21 RASH: Status: ACTIVE | Noted: 2025-02-26

## 2025-02-26 PROBLEM — L20.89 OTHER ATOPIC DERMATITIS: Status: ACTIVE | Noted: 2025-02-26

## 2025-03-05 ENCOUNTER — APPOINTMENT (OUTPATIENT)
Dept: DERMATOLOGY | Facility: CLINIC | Age: 70
End: 2025-03-05
Payer: COMMERCIAL

## 2025-03-05 ENCOUNTER — OFFICE VISIT (OUTPATIENT)
Dept: PULMONOLOGY | Facility: HOSPITAL | Age: 70
End: 2025-03-05
Payer: COMMERCIAL

## 2025-03-05 VITALS
SYSTOLIC BLOOD PRESSURE: 117 MMHG | HEART RATE: 79 BPM | DIASTOLIC BLOOD PRESSURE: 74 MMHG | BODY MASS INDEX: 32.79 KG/M2 | OXYGEN SATURATION: 98 % | WEIGHT: 191 LBS | TEMPERATURE: 97.7 F

## 2025-03-05 DIAGNOSIS — F17.200 TOBACCO USE DISORDER: ICD-10-CM

## 2025-03-05 DIAGNOSIS — G47.33 OSA (OBSTRUCTIVE SLEEP APNEA): ICD-10-CM

## 2025-03-05 DIAGNOSIS — J44.89 COPD WITH ASTHMA (MULTI): Primary | ICD-10-CM

## 2025-03-05 DIAGNOSIS — J44.1 COPD EXACERBATION (MULTI): ICD-10-CM

## 2025-03-05 DIAGNOSIS — I27.21 PULMONARY ARTERIAL HYPERTENSION (MULTI): ICD-10-CM

## 2025-03-05 DIAGNOSIS — L20.89 OTHER ATOPIC DERMATITIS: Primary | ICD-10-CM

## 2025-03-05 PROCEDURE — 1159F MED LIST DOCD IN RCRD: CPT | Performed by: STUDENT IN AN ORGANIZED HEALTH CARE EDUCATION/TRAINING PROGRAM

## 2025-03-05 PROCEDURE — 3078F DIAST BP <80 MM HG: CPT | Performed by: STUDENT IN AN ORGANIZED HEALTH CARE EDUCATION/TRAINING PROGRAM

## 2025-03-05 PROCEDURE — 99214 OFFICE O/P EST MOD 30 MIN: CPT | Mod: GC | Performed by: STUDENT IN AN ORGANIZED HEALTH CARE EDUCATION/TRAINING PROGRAM

## 2025-03-05 PROCEDURE — 1125F AMNT PAIN NOTED PAIN PRSNT: CPT | Performed by: STUDENT IN AN ORGANIZED HEALTH CARE EDUCATION/TRAINING PROGRAM

## 2025-03-05 PROCEDURE — 99214 OFFICE O/P EST MOD 30 MIN: CPT | Performed by: STUDENT IN AN ORGANIZED HEALTH CARE EDUCATION/TRAINING PROGRAM

## 2025-03-05 PROCEDURE — 3074F SYST BP LT 130 MM HG: CPT | Performed by: STUDENT IN AN ORGANIZED HEALTH CARE EDUCATION/TRAINING PROGRAM

## 2025-03-05 PROCEDURE — G2211 COMPLEX E/M VISIT ADD ON: HCPCS | Performed by: STUDENT IN AN ORGANIZED HEALTH CARE EDUCATION/TRAINING PROGRAM

## 2025-03-05 RX ORDER — TACROLIMUS 1 MG/G
OINTMENT TOPICAL 2 TIMES DAILY
Qty: 60 G | Refills: 11 | Status: SHIPPED | OUTPATIENT
Start: 2025-03-05 | End: 2026-03-05

## 2025-03-05 ASSESSMENT — PAIN SCALES - GENERAL: PAINLEVEL_OUTOF10: 5

## 2025-03-05 NOTE — PROGRESS NOTES
Department of Medicine I Division of Pulmonary, Critical Care, and Sleep Medicine   [id number] [place] [address] [address] [place] [address]  Phone: [phone]  Fax: [phone]    History of Present Illness   Cathy Curiel is a 69 y.o. female who is a current smoker (~0.5 PPD for 49 years, ~25 total pack years.) here for follow up for COPD & Asthma.    She was previously seen in pulmonary clinic in 9/2024. At that time she was evaluated for COPD, with worsening symptoms of dyspnea on exertion, on Breztri and PRN Albuterol, and using her albuterol about 6 times a day, ED visits with courses of steroids, and working on smoking cessation.     She was then seen in late October 2024, and reported persistence in her poorly controlled symptoms of her COPD, though reported consistent use of her Breztri, use of daily pantoprazole, twice a day flonase, and was also albuterol about every morning and night, and less during the day. She unfortunately reported that she was continuing to smoke at lower rate (now smoking about 1/2 pack per day rather than full pack), and had not started pulmonary rehab.    At her most recent visit in December, she reported persistence of symptoms, and especially worsened symptoms in the last few days before the visit, with a cough productive of yellow sputum that was different from her normal baseline (clear sputum production). She reported larger amount of mucous production than normal, and some associated malaise and cold shakes at home. She was given a course of levofloxacin for this acute illness.     She also reported intermittent wheezing at baseline, significant exertional dyspnea (continues to be consistent with her Breztri twice a day, and uses albuterol 3-4 times a day at least (6 with more activity). She reported she had continued to smoke (about 10 cigarettes a day), and she was working on getting a CPAP fixed. She was again amenable to pulmonary rehab.     Today, she  reports coughing a lot, productive of yellow sputum, along with persistent sinus pressure and a feeling of worsened sinuses within her throat. She has also noticed her dyspnea has worsened over the past two months, requiring her to take a break between bringing groceries in from the car or when exerting herself at all. She is using her albuterol four times a day most of the time. She is also regular with her maintenance inhaler therapy (breztri twice a day).     She continues to smoke 10 cigarettes a day (which is what she reported at our last visit in December. Reports she had stressful episodes that forced her to increase in cigarette use up to a pack and a half per day, and has weaned herself down to about 10 cigarettes a day. She continues to be motivated to quit especially in the setting of the birth of her grandson a week ago, who lives with her. She is again amenable to pulmonary rehab and getting involved with pulmonary rehab, as well as tobacco cessation team.      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         Immunizations     Immunization History   Administered Date(s) Administered    Flu vaccine (IIV4), preservative free *Check age/dose* 10/14/2014, 10/06/2015, 10/16/2017, 10/03/2018, 10/29/2019, 11/19/2020    Flu vaccine, quadrivalent, high-dose, preservative free, age 65y+ (FLUZONE) 11/10/2021, 10/11/2023    Flu vaccine, trivalent, preservative free, HIGH-DOSE, age 65y+ (Fluzone) 10/23/2024    Influenza, Unspecified 10/19/1999, 11/30/2010, 12/08/2011    Influenza, seasonal, injectable 10/01/2009, 11/09/2016, 11/10/2021    Moderna COVID-19 vaccine, bivalent, blue cap/gray label *Check age/dose* 04/05/2023    Moderna SARS-CoV-2 Vaccination  03/26/2021, 04/23/2021, 12/20/2021    Pneumococcal polysaccharide vaccine, 23-valent, age 2 years and older (PNEUMOVAX 23) 10/14/2014    Tdap vaccine, age 7 year and older (BOOSTRIX, ADACEL) 05/28/2010, 06/08/2021       Medications and Allergies     Current Outpatient Medications   Medication Instructions    acetaminophen (Tylenol) 500 mg tablet 2 tablets, As needed    albuterol 90 mcg/actuation inhaler 1-2 puffs, inhalation, Every 6 hours PRN, Every 4 to 6 hours as needed    albuterol 2.5 mg, nebulization, 4 times daily PRN    alcohol swabs (Alcohol Prep Pads) pads, medicated 1 Pad, topical (top), Daily    ALPRAZolam (XANAX) 1 mg, oral, Once PRN Procedure, Take 1 hour prior to planned MRI.    betamethasone valerate (Valisone) 0.1 % ointment Topical, 2 times daily, To raised rough skin until smooth    blood sugar diagnostic (OneTouch Ultra Test) strip TEST ONCE DAILY    budesonide-glycopyr-formoterol (Breztri Aerosphere) 160-9-4.8 mcg/actuation HFA aerosol inhaler 2 puffs, inhalation, 2 times daily    diphenhydrAMINE (BENADryl) 25 mg capsule Take one 25mg tablet one hour prior to your cardiac CT scan    fluticasone (Flonase) 50 mcg/actuation nasal spray 1 spray, Each Nostril, Daily, Shake gently. Before first use, prime pump. After use, clean tip and replace cap.    FreeStyle glucose monitoring (OneTouch Ultra2 Meter) kit 1 each, miscellaneous, Daily    lancets (Plumbeetouch Delica Safety Lancet) 30 gauge misc 1 Lancet, miscellaneous, Daily    losartan-hydrochlorothiazide (Hyzaar) 100-25 mg tablet 1 tablet, oral, Daily    metFORMIN XR (GLUCOPHAGE-XR) 500 mg, oral, Daily with breakfast, Do not crush, chew, or split.    metoprolol tartrate (LOPRESSOR) 100 mg, oral, Once PRN Procedure    mv,rex,min/iron/folic acid/lut (COMPLETE MULTI ORAL) 1 capsule, Daily    predniSONE (Deltasone) 50 mg tablet Take one 50mg tablet 13 hours, 7 hours, and 1 hour prior to your cardiac CT scan    rosuvastatin (CRESTOR) 40 mg, oral, Every Day  "   sodium chloride (Saline Nose) 0.65 % nasal spray 1 spray, Each Nostril, As needed    triamcinolone (Kenalog) 0.1 % ointment Topical, 2 times daily, Apply gently to the affected area three times daily to four times daily        Allergies:  Azithromycin, Meloxicam, Benzonatate, Etodolac, Iodinated contrast media, Latex, Penicillin, and Penicillins    Social History   She reports that she has been smoking cigarettes. She started smoking about 40 years ago. She has a 40.5 pack-year smoking history. She has never used smokeless tobacco. She reports that she does not currently use alcohol. She reports that she does not use drugs.    Smoking History:   Previously a pack a day, now 10 cigarettes a day    Family History   Fam hx of sarcoidosis     Surgical History   She has a past surgical history that includes Other surgical history (04/04/2022); CT guided percutaneous biopsy bone deep (08/29/2022); and Hysterectomy.    Physical Exam         10/23/2024     2:08 PM 11/11/2024     9:40 AM 11/11/2024    10:38 AM 11/11/2024    10:41 AM 12/4/2024     1:46 PM 12/27/2024     8:53 AM 2/24/2025    12:55 PM   Vitals   Systolic 110 161 124 108 123 116 138   Diastolic 72 76 73 60 76 80 79   BP Location      Right arm Right arm   Heart Rate 98 67 70 68 86 85 101   Temp 36.4 °C (97.6 °F)    36.6 °C (97.8 °F) 36.4 °C (97.5 °F) 36.1 °C (97 °F)   Resp     18 18    Height     1.626 m (5' 4\") 1.626 m (5' 4\") 1.626 m (5' 4\")   Weight (lb) 194    191.2 192 190   BMI 33.3 kg/m2    32.82 kg/m2 32.96 kg/m2 32.61 kg/m2   BSA (m2) 1.99 m2    1.98 m2 1.98 m2 1.97 m2   Visit Report Report    Report Report Report        Physical Exam  Constitutional: alert oriented not in acute distress  HEENT: normocephalic atraumatic mmm  CV: normal s1 and s2 and rhythm/rate  Respiratory: clear to auscultation bilaterally, no wheezes or crackles  Abdomen: soft non tender non distended  Extremities: warm and well perfused    Results   Pulmonary Function " Tests:  2/8/24: - FEV1/FVC 0.51/ FEV1 1.56 (69% - almost BD resp)/ FVC 2.95 (102%)      8/4/22: FEV1/FVC 0.55/ FEV1 1.67 (82%)/ FVC 3.01 (116%)/ TLC 5.11 (115%)/ DLCO 62% -- FEF 25/75 27%   6 MWTs: 8/4/22 - 404m (,) 97 -> 95% on RA, DARCIE 0     FENO: 1/9/24 - 8ppb        Chest Radiograph:  XR chest 2 views 09/04/2024  Impression  No acute cardiopulmonary process.  MACRO:  None    Chest CT Scan:  CT LUNG SCREENING LOW DOSE 06/24/2022  FINDINGS:  LUNGS AND AIRWAYS:  The trachea and central airways are patent. No endobronchial lesion is seen.  Mild upper lobe predominant centrilobular and paraseptal emphysema is seen. In addition, there is mild mosaic lung attenuation, which may be seen in the setting of small airway or small vessel disease. There is mild bibasilar dependent atelectasis. No other focal consolidation, pleural effusion or pneumothorax.  There is a 5-mm solid noncalcified lung nodule in the left upper lobe (axial image 103 of 294), overall stable since the prior study from 2010, consistent with benign etiology given long-term stability.  Additional small 3-mm subpleural solid lung nodule is seen in the posterior left lower lobe (axial image 231 of 294), not identified with certainty on the prior study. Another 3-mm subpleural ground-glass nodule is seen at the periphery of the left upper lobe (axial image 93 of 294)  MEDIASTINUM AND ASHLEY, LOWER NECK AND AXILLA:  The visualized thyroid gland is enlarged and heterogeneous, overall stable.  No evidence of thoracic lymphadenopathy by CT criteria. Multiple small calcified hilar and mediastinal lymph nodes are consistent with prior granulomatous exposure.  Esophagus appears within normal limits as seen.  HEART AND VESSELS:  The thoracic aorta normal in course and caliber.Moderate  atherosclerosis is present, including calcified and noncalcified  plaques along the visualized thoracoabdominal aorta and major branches.  Main pulmonary artery and its branches  are normal in caliber.  Moderate coronary artery calcifications are seen. Please note,the study is not optimized for evaluation of coronary arteries.  Heart is at the top limits of normal in size. The cardiac chambers are not enlarged. There is no pericardial effusion seen.  CHEST WALL AND OSSEOUS STRUCTURES:  Chest wall is within normal limits.  No acute osseous pathology. Small sclerotic lesion in the T9  vertebral body is persistent since 2010, likely a bone island.There are no suspicious osseous lesions.Mild multilevel degenerative changes within visualized spine.  Impression  1. Few small lung nodules measuring up to 5 mm as above, likely benign. Continued annual screening with low-dose noncontrast chest CT is recommended.  2. Mild emphysema. Prior granulomatous exposure.  3. Borderline cardiomegaly.  4. Moderate coronary artery calcifications, indicating the presence of coronary artery disease. If the patient has associated symptoms recommend management as per chest pain guidelines. If the patient is asymptomatic consider reviewing modifiable cardiovascular risk factors and managing as per guidelines for primary prevention   5. Other findings as above.    ECHO:  Echocardiogram: 6/8/22 - EF 65%, Diastolic dysfunction. RA normal size, RV normal size and function     Labs:  Lab Results   Component Value Date    WBC 11.9 (H) 10/15/2024    HGB 13.9 10/15/2024    HCT 42.8 10/15/2024    MCV 91 10/15/2024     10/15/2024       Lab Results   Component Value Date    CREATININE 1.11 (H) 10/15/2024    BUN 20 10/15/2024     10/15/2024    K 4.3 10/15/2024     10/15/2024    CO2 25 10/15/2024        Eosinophils Absolute (x10*3/uL)   Date Value   09/04/2024 0.18     Eosinophils % (%)   Date Value   09/04/2024 1.3          Assessment and Plan   Cathy Curiel is a 69 y.o. female with ERA who is a current smoker (~0.5 PPD for 49 years, ~25 total pack years.) here for follow up for COPD & Asthma. Her symptoms  are very poorly controlled, and she experiences dyspnea with even minimal exertion. She is currently on Breztri that she uses twice a day and is very consistent with. She continues to use her albuterol PRN, up to 4-6 times. She unfortunately is currently continuing to smoke, working on smoking cessation and amenable to pulmonary rehab. If her more subacute symptoms today worsen, we encourage her to call back to re-evaluate for further therapies.    - Continue current regimen: Breztri scheduled, albuterol PRN  - Discussed being consistent with Flonase therapy to try and improve sinus symptoms  - Continue to encourage smoking cessation, we discussed using the imminent arrival of her great grandbaby as motivation [using patches and lozenges prescribed by PCP]  - Referral to pulmonary rehab  - Smoking cessation counseling referral  - Continue pantoprazole daily  - She will make an appointment with sleep provider to get her CPAP fixed (made referral)     Follow-up:  4-6 months    Patient was seen, evaluated, and discussed with attending physician, Dr. Borges.    Sanjuana Gomez MD    ======  I saw and evaluated the patient. I personally obtained the key and critical portions of the history and physical exam or was physically present for key and critical portions performed by the resident/fellow. I reviewed the resident/fellow's documentation and discussed the patient with the resident/fellow. I agree with the resident/fellow's medical decision making as documented in the note.    Jack Borges MD

## 2025-03-05 NOTE — PATIENT INSTRUCTIONS
It was a pleasure to see you today in clinic!     Today we discussed your COPD.     The plan going forward is as follows:   Continue the following medications:   - Continue Breztri, Albuterol PRN  - Start using your fluticasone daily - it will help your sinuses  - If your symptoms get worse, please call in      Follow-up with other practitioners and referrals given:  - Sleep  - Smoking cessation  - Pulmonary rehab    Please follow up with me in: 4 months    If you have any further questions feel free to call my office at [phone] (choose #2 to reach a pulmonary nurse) and please allow 1-2 business days for a response.     If you have any scheduling needs feel free to call [phone] (for breathing or walking test), [phone] (for EKG's, echocardiograms or cardiopulmonary stress test), and [phone] (for radiology tests such as CT scan, MRIs and nuclear medicine tests).    Sanjuana Gomez  Pulmonary and Critical Care Fellow     [id number] [place] [address]

## 2025-03-06 ENCOUNTER — TELEPHONE (OUTPATIENT)
Dept: CARDIAC REHAB | Facility: HOSPITAL | Age: 70
End: 2025-03-06

## 2025-03-06 NOTE — PROGRESS NOTES
Subjective     Cathy Curiel is a 69 y.o. female who presents for the following: Rash (Red itchy patches of skin to torso, arms, legs. Began about 3 weeks ago. Using triamcinolone cream, drying it out. Hx of eczema. ).     Review of Systems:  No other skin or systemic complaints other than what is documented elsewhere in the note.    The following portions of the chart were reviewed this encounter and updated as appropriate:          Skin Cancer History  No skin cancer on file.      Specialty Problems          Dermatology Problems    Erythematous eczema    Tinea corporis    Itching    Other atopic dermatitis    Rash        Objective   Well appearing patient in no apparent distress; mood and affect are within normal limits.    A focused skin examination was performed. All findings within normal limits unless otherwise noted below.    Assessment/Plan   1. Other atopic dermatitis  Erythematous scaly papules and plaques with overyling excoriation located symmetrically in the forearms and back    Chronic condition, flaring today  Reports using triamcinolone which is mildly helpful, slightly improved    Start betamethasone ointment bid for 1 week  Alternate with protopic 0.1% ointment bid for 1 week  Reviewed black box warning and risk of stinging of skin  Reviewed risk of skin thinning with steroids      Related Procedures  Follow Up In Dermatology - Established Patient    Related Medications  betamethasone valerate (Valisone) 0.1 % ointment  Apply topically 2 times a day. To raised rough skin until smooth

## 2025-03-18 ENCOUNTER — OFFICE VISIT (OUTPATIENT)
Facility: HOSPITAL | Age: 70
End: 2025-03-18
Payer: COMMERCIAL

## 2025-03-18 VITALS
HEIGHT: 64 IN | BODY MASS INDEX: 32.54 KG/M2 | TEMPERATURE: 97.9 F | HEART RATE: 73 BPM | RESPIRATION RATE: 18 BRPM | DIASTOLIC BLOOD PRESSURE: 80 MMHG | WEIGHT: 190.6 LBS | SYSTOLIC BLOOD PRESSURE: 130 MMHG | OXYGEN SATURATION: 98 %

## 2025-03-18 DIAGNOSIS — R10.9 RIGHT FLANK PAIN: Primary | ICD-10-CM

## 2025-03-18 DIAGNOSIS — E11.9 CONTROLLED TYPE 2 DIABETES MELLITUS WITHOUT COMPLICATION, WITHOUT LONG-TERM CURRENT USE OF INSULIN (MULTI): ICD-10-CM

## 2025-03-18 DIAGNOSIS — I10 PRIMARY HYPERTENSION: ICD-10-CM

## 2025-03-18 DIAGNOSIS — Z12.11 COLON CANCER SCREENING: ICD-10-CM

## 2025-03-18 PROCEDURE — 99214 OFFICE O/P EST MOD 30 MIN: CPT

## 2025-03-18 PROCEDURE — 99214 OFFICE O/P EST MOD 30 MIN: CPT | Mod: GC

## 2025-03-18 PROCEDURE — 3008F BODY MASS INDEX DOCD: CPT

## 2025-03-18 PROCEDURE — 1125F AMNT PAIN NOTED PAIN PRSNT: CPT

## 2025-03-18 PROCEDURE — 3075F SYST BP GE 130 - 139MM HG: CPT

## 2025-03-18 PROCEDURE — 3079F DIAST BP 80-89 MM HG: CPT

## 2025-03-18 RX ORDER — KETOROLAC TROMETHAMINE 10 MG/1
10 TABLET, FILM COATED ORAL EVERY 6 HOURS PRN
Qty: 20 TABLET | Refills: 0 | Status: SHIPPED | OUTPATIENT
Start: 2025-03-18 | End: 2025-03-23

## 2025-03-18 RX ORDER — IBUPROFEN 800 MG/1
800 TABLET ORAL 3 TIMES DAILY PRN
Qty: 180 TABLET | Refills: 3 | Status: SHIPPED | OUTPATIENT
Start: 2025-03-24 | End: 2026-03-24

## 2025-03-18 RX ORDER — METFORMIN HYDROCHLORIDE 500 MG/1
500 TABLET, EXTENDED RELEASE ORAL
Qty: 90 TABLET | Refills: 3 | Status: SHIPPED | OUTPATIENT
Start: 2025-03-18 | End: 2026-03-18

## 2025-03-18 RX ORDER — LOSARTAN POTASSIUM AND HYDROCHLOROTHIAZIDE 25; 100 MG/1; MG/1
1 TABLET ORAL DAILY
Qty: 90 TABLET | Refills: 3 | Status: SHIPPED | OUTPATIENT
Start: 2025-03-18 | End: 2026-03-18

## 2025-03-18 ASSESSMENT — PAIN SCALES - GENERAL: PAINLEVEL_OUTOF10: 6

## 2025-03-18 NOTE — PROGRESS NOTES
Subjective   Patient ID: Cathy Curiel is a 69 y.o. female who presents for Follow-up, Back Pain, and Med Refill.    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    -Right-sided dull, constant pain 8/10.    -Irritated by movement (e.g., mopping), laying on right side.    -Different feeling than herniated disk.    -No relief with ibuprofen or heating pad. Minimal relief with Tylenol.    -Reports pressure with urination. Admits to occasional numbness/tingling in right lower extremity.    -Admits to loose stool x2 (after eating beef).    -Without N/V, fever, chills, blood in stool, trauma, or falls.    -UA Feb 2025 shows trace blood.    -CT Aug 2024:      -Simple cyst in left kidney (1.5 cm).      -No nephrolithiasis or hydronephrosis.      -Bladder wall normal thickness.    -Last colonoscopy >10 years ago.    -Mammogram 9/11/24: No malignancy.      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     Past Surgical History:   Procedure Laterality Date    CT GUIDED PERCUTANEOUS BIOPSY BONE DEEP  08/29/2022    CT GUIDED PERCUTANEOUS BIOPSY BONE DEEP 8/29/2022 [facility]    HYSTERECTOMY      OTHER SURGICAL HISTORY  04/04/2022    Shoulder arthroscopy     Social History     Tobacco Use    Smoking status: Every Day     Current packs/day: 1.00     Average packs/day: 1 pack/day for 40.5 years (40.5 ttl pk-yrs)     Types: Cigarettes     Start date: 9/6/1984    Smokeless tobacco: Never   Vaping Use    Vaping status: Never Used    Substance Use Topics    Alcohol use: Not Currently    Drug use: Never     Family History   Problem Relation Name Age of Onset    Uterine cancer Mother      Hypertension Mother          BENIGN    Other (CVA) Father      Hypertension Father      Throat cancer Brother      Breast cancer Other       Allergies   Allergen Reactions    Azithromycin Hives, Shortness of breath and Swelling     feet swell and blister    Meloxicam Hives and Unknown    Benzonatate Hives, Itching and Rash    Etodolac Other     HA    Iodinated Contrast Media Rash    Latex Rash    Penicillin Swelling    Penicillins Unknown     Current Outpatient Medications   Medication Instructions    acetaminophen (Tylenol) 500 mg tablet 2 tablets, As needed    albuterol 90 mcg/actuation inhaler 1-2 puffs, inhalation, Every 6 hours PRN, Every 4 to 6 hours as needed    albuterol 2.5 mg, nebulization, 4 times daily PRN    alcohol swabs (Alcohol Prep Pads) pads, medicated 1 Pad, topical (top), Daily    ALPRAZolam (XANAX) 1 mg, oral, Once PRN Procedure, Take 1 hour prior to planned MRI.    betamethasone valerate (Valisone) 0.1 % ointment Topical, 2 times daily, To raised rough skin until smooth    blood sugar diagnostic (OneTouch Ultra Test) strip TEST ONCE DAILY    budesonide-glycopyr-formoterol (Breztri Aerosphere) 160-9-4.8 mcg/actuation HFA aerosol inhaler 2 puffs, inhalation, 2 times daily    diphenhydrAMINE (BENADryl) 25 mg capsule Take one 25mg tablet one hour prior to your cardiac CT scan    fluticasone (Flonase) 50 mcg/actuation nasal spray 1 spray, Each Nostril, Daily, Shake gently. Before first use, prime pump. After use, clean tip and replace cap.    FreeStyle glucose monitoring (OneTouch Ultra2 Meter) kit 1 each, miscellaneous, Daily    [START ON 3/24/2025] ibuprofen 800 mg, oral, 3 times daily PRN    ketorolac (TORADOL) 10 mg, oral, Every 6 hours PRN    lancets (Way2[employer]uch Delica Safety Lancet) 30 gauge misc 1 Lancet, miscellaneous, Daily     losartan-hydrochlorothiazide (Hyzaar) 100-25 mg tablet 1 tablet, oral, Daily    metFORMIN XR (GLUCOPHAGE-XR) 500 mg, oral, Daily with breakfast, Do not crush, chew, or split.    metoprolol tartrate (LOPRESSOR) 100 mg, oral, Once PRN Procedure    mv,rex,min/iron/folic acid/lut (COMPLETE MULTI ORAL) 1 capsule, Daily    predniSONE (Deltasone) 50 mg tablet Take one 50mg tablet 13 hours, 7 hours, and 1 hour prior to your cardiac CT scan    rosuvastatin (CRESTOR) 40 mg, oral, Every Day    sodium chloride (Saline Nose) 0.65 % nasal spray 1 spray, Each Nostril, As needed    tacrolimus (Protopic) 0.1 % ointment Topical, 2 times daily, To rash until clear    triamcinolone (Kenalog) 0.1 % ointment Topical, 2 times daily, Apply gently to the affected area three times daily to four times daily       Objective       Physical Exam  Constitutional:       General: She is not in acute distress.  Abdominal:      General: There is no distension.      Tenderness: There is no abdominal tenderness. There is right CVA tenderness. There is no left CVA tenderness.   Musculoskeletal:         General: Tenderness present.   Neurological:      Mental Status: She is alert.      Gait: Gait normal.   Psychiatric:         Mood and Affect: Mood normal.       Assessment/Plan   Cathy Curiel is a 69 y.o. 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    :: Possible herniated disc/low back issue.    PLAN    -Obtain UA.    -Counseled on possibility of renal stones and likelihood of spontaneous passage. Consider Flomax if stone highly suspected.    -PT to address low back pain.  - toradol for pain control    Problem List Items Addressed This Visit       Controlled type 2 diabetes  mellitus without complication, without long-term current use of insulin (Multi)    Relevant Medications    metFORMIN XR (Glucophage-XR) 500 mg 24 hr tablet    Hypertension    Relevant Medications    losartan-hydrochlorothiazide (Hyzaar) 100-25 mg tablet     Other Visit Diagnoses       Right flank pain    -  Primary    Relevant Medications    ibuprofen 800 mg tablet (Start on 3/24/2025)    ketorolac (Toradol) 10 mg tablet    Other Relevant Orders    Urinalysis with Reflex Microscopic    Colon cancer screening        Relevant Orders    Colonoscopy Screening; Average Risk Patient            Return in : 1 month    Discussed with co-signer of note Dr. Kinney      Portions of this note were generated using digital voice recognition software, and may contain grammatical errors       Nicola Mojica, DO  Family Medicine PGY-3

## 2025-03-21 LAB
APPEARANCE UR: CLEAR
BILIRUB UR QL STRIP: NEGATIVE
COLOR UR: YELLOW
GLUCOSE UR QL STRIP: NEGATIVE
HGB UR QL STRIP: NEGATIVE
KETONES UR QL STRIP: ABNORMAL
LEUKOCYTE ESTERASE UR QL STRIP: NEGATIVE
NITRITE UR QL STRIP: NEGATIVE
PH UR STRIP: 5.5 [PH] (ref 5–8)
PROT UR QL STRIP: NEGATIVE
SP GR UR STRIP: 1.02 (ref 1–1.03)

## 2025-04-02 DIAGNOSIS — E78.5 HYPERLIPIDEMIA, UNSPECIFIED HYPERLIPIDEMIA TYPE: ICD-10-CM

## 2025-04-07 RX ORDER — ROSUVASTATIN CALCIUM 40 MG/1
40 TABLET, COATED ORAL
Qty: 90 TABLET | Refills: 1 | Status: SHIPPED | OUTPATIENT
Start: 2025-04-07

## 2025-04-08 ENCOUNTER — APPOINTMENT (OUTPATIENT)
Facility: HOSPITAL | Age: 70
End: 2025-04-08
Payer: COMMERCIAL

## 2025-04-28 ENCOUNTER — OFFICE VISIT (OUTPATIENT)
Facility: HOSPITAL | Age: 70
End: 2025-04-28
Payer: COMMERCIAL

## 2025-04-28 VITALS
WEIGHT: 191.4 LBS | OXYGEN SATURATION: 98 % | DIASTOLIC BLOOD PRESSURE: 80 MMHG | SYSTOLIC BLOOD PRESSURE: 135 MMHG | HEIGHT: 64 IN | TEMPERATURE: 97.5 F | RESPIRATION RATE: 18 BRPM | HEART RATE: 93 BPM | BODY MASS INDEX: 32.68 KG/M2

## 2025-04-28 DIAGNOSIS — L30.9: ICD-10-CM

## 2025-04-28 DIAGNOSIS — R10.9 RIGHT FLANK PAIN: Primary | ICD-10-CM

## 2025-04-28 DIAGNOSIS — Z12.2 SCREENING FOR LUNG CANCER: ICD-10-CM

## 2025-04-28 DIAGNOSIS — Z87.891 PERSONAL HISTORY OF NICOTINE DEPENDENCE: ICD-10-CM

## 2025-04-28 DIAGNOSIS — E11.9 CONTROLLED TYPE 2 DIABETES MELLITUS WITHOUT COMPLICATION, WITHOUT LONG-TERM CURRENT USE OF INSULIN: ICD-10-CM

## 2025-04-28 PROCEDURE — 3008F BODY MASS INDEX DOCD: CPT

## 2025-04-28 PROCEDURE — 1125F AMNT PAIN NOTED PAIN PRSNT: CPT

## 2025-04-28 PROCEDURE — 1159F MED LIST DOCD IN RCRD: CPT

## 2025-04-28 PROCEDURE — 99214 OFFICE O/P EST MOD 30 MIN: CPT | Mod: GC

## 2025-04-28 PROCEDURE — 3075F SYST BP GE 130 - 139MM HG: CPT

## 2025-04-28 PROCEDURE — 99214 OFFICE O/P EST MOD 30 MIN: CPT

## 2025-04-28 PROCEDURE — 3079F DIAST BP 80-89 MM HG: CPT

## 2025-04-28 PROCEDURE — 4004F PT TOBACCO SCREEN RCVD TLK: CPT

## 2025-04-28 RX ORDER — BLOOD-GLUCOSE CONTROL, NORMAL
1 EACH MISCELLANEOUS DAILY
Qty: 100 EACH | Refills: 3 | Status: SHIPPED | OUTPATIENT
Start: 2025-04-28

## 2025-04-28 RX ORDER — BLOOD SUGAR DIAGNOSTIC
1 STRIP MISCELLANEOUS DAILY
Qty: 100 STRIP | Refills: 3 | Status: SHIPPED | OUTPATIENT
Start: 2025-04-28

## 2025-04-28 RX ORDER — TRIAMCINOLONE ACETONIDE 1 MG/G
OINTMENT TOPICAL 2 TIMES DAILY
Qty: 80 G | Refills: 0 | Status: SHIPPED | OUTPATIENT
Start: 2025-04-28 | End: 2025-07-27

## 2025-04-28 ASSESSMENT — PAIN SCALES - GENERAL: PAINLEVEL_OUTOF10: 8

## 2025-04-28 NOTE — PROGRESS NOTES
Subjective   Patient ID: Cathy Curiel is a 69 y.o. female who presents for Hand Pain, Rash, and Back Pain (Lower right side ).    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 Reports frequency improved   - Some relief with Tylenol    - Denies dysuria, constipation, sexual activity, fever, chills, blood in stool/urine, or trauma    - Weight 191 lbs (was 197 lbs in April 2024)    - Not interested in physical therapy    - Goal is to start walking    - Smokes 1/2 pack per day, smoking since age 16      Review of Systems  As per HPI   Previous history  Medical History[1]  Surgical History[2]  Social History[3]  Family History[4]  Allergies[5]  Current Outpatient Medications   Medication Instructions    acetaminophen (Tylenol) 500 mg tablet 2 tablets, As needed    albuterol 90 mcg/actuation inhaler 1-2 puffs, inhalation, Every 6 hours PRN, Every 4 to 6 hours as needed    albuterol 2.5 mg, nebulization, 4 times daily PRN    alcohol swabs (Alcohol Prep Pads) pads, medicated 1 Pad, topical (top), Daily    blood sugar diagnostic (OneTouch Ultra Test) 1 each, Topical, Daily    budesonide-glycopyr-formoterol (Breztri Aerosphere) 160-9-4.8 mcg/actuation HFA aerosol inhaler 2 puffs, inhalation, 2 times daily    fluticasone (Flonase) 50 mcg/actuation nasal spray 1 spray, Each Nostril, Daily, Shake gently.  Before first use, prime pump. After use, clean tip and replace cap.    FreeStyle glucose monitoring (OneTouch Ultra2 Meter) kit 1 each, miscellaneous, Daily    ibuprofen 800 mg, oral, 3 times daily PRN    lancets (Onetouch Delica Safety Lancet) 30 gauge misc 1 Lancet, miscellaneous, Daily    losartan-hydrochlorothiazide (Hyzaar) 100-25 mg tablet 1 tablet, oral, Daily    metFORMIN XR (GLUCOPHAGE-XR) 500 mg, oral, Daily with breakfast, Do not crush, chew, or split.    metoprolol tartrate (LOPRESSOR) 100 mg, oral, Once PRN Procedure    mv,rex,min/iron/folic acid/lut (COMPLETE MULTI ORAL) 1 capsule, Daily    rosuvastatin (CRESTOR) 40 mg, oral, Every Day    sodium chloride (Saline Nose) 0.65 % nasal spray 1 spray, Each Nostril, As needed    tacrolimus (Protopic) 0.1 % ointment Topical, 2 times daily, To rash until clear    triamcinolone (Kenalog) 0.1 % ointment Topical, 2 times daily, Apply gently to the affected area three times daily to four times daily       Objective     Vitals:    04/28/25 0946   BP: 135/80   Pulse: 93   Resp: 18   Temp: 36.4 °C (97.5 °F)   SpO2: 98%     Physical Exam  Cardiovascular:      Rate and Rhythm: Normal rate.   Abdominal:      Comments: Minimal TTP RLQ   Musculoskeletal:      Comments: Minimal TTP along lumbosacral spine   Neurological:      Mental Status: She is alert.   Psychiatric:         Mood and Affect: Mood normal.           Assessment/Plan   Cathy Curiel is a 69 y.o. 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), atopic dermatis, HTN, diverticulosis  who presents for the concerns below:    # Rash    :: Atopic dermatitis    PLAN    - Refill triamcinolone 0.1% ointment    # Right Flank Pain    :: Suspect musculoskeletal pain    PLAN    - Encourage stretching and exercise    - Order CBC with differential due to history of cancer  Problem List Items  Addressed This Visit       Controlled type 2 diabetes mellitus without complication, without long-term current use of insulin    Relevant Medications    lancets (Onetouch Delica Safety Lancet) 30 gauge misc    blood sugar diagnostic (OneTouch Ultra Test)    Erythematous eczema    Relevant Medications    triamcinolone (Kenalog) 0.1 % ointment     Other Visit Diagnoses         Right flank pain    -  Primary    Relevant Orders    CBC and Auto Differential      Screening for lung cancer        Relevant Orders    CT lung screening low dose      Personal history of nicotine dependence        Relevant Orders    CT lung screening low dose          Return in : first available for medicare annual visit    Discussed with co-signer of note Dr. Miranda      Portions of this note were generated using digital voice recognition software, and may contain grammatical errors       Nicola Mojica, DO  Family Medicine PGY-3       [1]   Past Medical History:  Diagnosis Date    Asthma     COPD (chronic obstructive pulmonary disease) (Multi)     Diabetes mellitus (Multi)     Other intervertebral disc displacement, lumbar region     Lumbar disc herniation    Spindle cell sarcoma (Multi)     Left distal arm   [2]   Past Surgical History:  Procedure Laterality Date    CT GUIDED PERCUTANEOUS BIOPSY BONE DEEP  08/29/2022    CT GUIDED PERCUTANEOUS BIOPSY BONE DEEP 8/29/2022 [facility]    HYSTERECTOMY      OTHER SURGICAL HISTORY  04/04/2022    Shoulder arthroscopy   [3]   Social History  Tobacco Use    Smoking status: Every Day     Current packs/day: 1.00     Average packs/day: 1 pack/day for 40.6 years (40.6 ttl pk-yrs)     Types: Cigarettes     Start date: 9/6/1984    Smokeless tobacco: Never   Vaping Use    Vaping status: Never Used   Substance Use Topics    Alcohol use: Not Currently    Drug use: Never   [4]   Family History  Problem Relation Name Age of Onset    Uterine cancer Mother      Hypertension Mother          BENIGN    Other  (CVA) Father      Hypertension Father      Throat cancer Brother      Breast cancer Other     [5]   Allergies  Allergen Reactions    Azithromycin Hives, Shortness of breath and Swelling     feet swell and blister    Meloxicam Hives and Unknown    Benzonatate Hives, Itching and Rash    Etodolac Other     HA    Iodinated Contrast Media Rash    Latex Rash    Penicillin Swelling    Penicillins Unknown

## 2025-04-30 NOTE — PROGRESS NOTES
I saw and evaluated the patient. I personally obtained the key and critical portions of the history and physical exam or was physically present for key and critical portions performed by the resident/fellow. I reviewed the resident/fellow's documentation and discussed the patient with the resident/fellow. I agree with the resident/fellow's medical decision making as documented in the note.    Jordan Miranda MD

## 2025-05-12 DIAGNOSIS — J45.52 SEVERE PERSISTENT ASTHMA WITH STATUS ASTHMATICUS (MULTI): ICD-10-CM

## 2025-05-15 LAB
BASOPHILS # BLD AUTO: 60 CELLS/UL (ref 0–200)
BASOPHILS NFR BLD AUTO: 0.6 %
EOSINOPHIL # BLD AUTO: 170 CELLS/UL (ref 15–500)
EOSINOPHIL NFR BLD AUTO: 1.7 %
ERYTHROCYTE [DISTWIDTH] IN BLOOD BY AUTOMATED COUNT: 13.1 % (ref 11–15)
HCT VFR BLD AUTO: 42.4 % (ref 35–45)
HGB BLD-MCNC: 13.6 G/DL (ref 11.7–15.5)
LYMPHOCYTES # BLD AUTO: 2750 CELLS/UL (ref 850–3900)
LYMPHOCYTES NFR BLD AUTO: 27.5 %
MCH RBC QN AUTO: 30.2 PG (ref 27–33)
MCHC RBC AUTO-ENTMCNC: 32.1 G/DL (ref 32–36)
MCV RBC AUTO: 94 FL (ref 80–100)
MONOCYTES # BLD AUTO: 920 CELLS/UL (ref 200–950)
MONOCYTES NFR BLD AUTO: 9.2 %
NEUTROPHILS # BLD AUTO: 6100 CELLS/UL (ref 1500–7800)
NEUTROPHILS NFR BLD AUTO: 61 %
PLATELET # BLD AUTO: 328 THOUSAND/UL (ref 140–400)
PMV BLD REES-ECKER: 9.4 FL (ref 7.5–12.5)
RBC # BLD AUTO: 4.51 MILLION/UL (ref 3.8–5.1)
WBC # BLD AUTO: 10 THOUSAND/UL (ref 3.8–10.8)

## 2025-05-30 RX ORDER — BUDESONIDE, GLYCOPYRROLATE, AND FORMOTEROL FUMARATE 160; 9; 4.8 UG/1; UG/1; UG/1
2 AEROSOL, METERED RESPIRATORY (INHALATION) 2 TIMES DAILY
Qty: 72 G | Refills: 3 | Status: SHIPPED | OUTPATIENT
Start: 2025-05-30 | End: 2026-05-30

## 2025-06-11 ENCOUNTER — APPOINTMENT (OUTPATIENT)
Dept: DERMATOLOGY | Facility: CLINIC | Age: 70
End: 2025-06-11
Payer: COMMERCIAL

## 2025-06-17 ENCOUNTER — OFFICE VISIT (OUTPATIENT)
Facility: HOSPITAL | Age: 70
End: 2025-06-17
Payer: COMMERCIAL

## 2025-06-17 VITALS
OXYGEN SATURATION: 96 % | HEIGHT: 65 IN | WEIGHT: 193 LBS | BODY MASS INDEX: 32.15 KG/M2 | DIASTOLIC BLOOD PRESSURE: 84 MMHG | TEMPERATURE: 97 F | HEART RATE: 93 BPM | SYSTOLIC BLOOD PRESSURE: 145 MMHG

## 2025-06-17 DIAGNOSIS — C49.9 SPINDLE CELL SARCOMA (MULTI): ICD-10-CM

## 2025-06-17 DIAGNOSIS — I10 PRIMARY HYPERTENSION: Primary | ICD-10-CM

## 2025-06-17 DIAGNOSIS — J30.1 SEASONAL ALLERGIC RHINITIS DUE TO POLLEN: ICD-10-CM

## 2025-06-17 DIAGNOSIS — E11.9 CONTROLLED TYPE 2 DIABETES MELLITUS WITHOUT COMPLICATION, WITHOUT LONG-TERM CURRENT USE OF INSULIN: ICD-10-CM

## 2025-06-17 DIAGNOSIS — L30.9: ICD-10-CM

## 2025-06-17 DIAGNOSIS — R25.2 CRAMP AND SPASM: ICD-10-CM

## 2025-06-17 DIAGNOSIS — J01.00 ACUTE NON-RECURRENT MAXILLARY SINUSITIS: ICD-10-CM

## 2025-06-17 PROCEDURE — 99213 OFFICE O/P EST LOW 20 MIN: CPT | Mod: GC

## 2025-06-17 PROCEDURE — 1125F AMNT PAIN NOTED PAIN PRSNT: CPT

## 2025-06-17 PROCEDURE — 3077F SYST BP >= 140 MM HG: CPT

## 2025-06-17 PROCEDURE — 99213 OFFICE O/P EST LOW 20 MIN: CPT

## 2025-06-17 PROCEDURE — 3079F DIAST BP 80-89 MM HG: CPT

## 2025-06-17 PROCEDURE — G2211 COMPLEX E/M VISIT ADD ON: HCPCS

## 2025-06-17 PROCEDURE — 4004F PT TOBACCO SCREEN RCVD TLK: CPT

## 2025-06-17 PROCEDURE — 1159F MED LIST DOCD IN RCRD: CPT

## 2025-06-17 PROCEDURE — 3008F BODY MASS INDEX DOCD: CPT

## 2025-06-17 RX ORDER — DOXYCYCLINE 100 MG/1
100 CAPSULE ORAL 2 TIMES DAILY
Qty: 14 CAPSULE | Refills: 0 | Status: SHIPPED | OUTPATIENT
Start: 2025-06-17 | End: 2025-06-24

## 2025-06-17 RX ORDER — POLYETHYLENE GLYCOL-3350 AND ELECTROLYTES 236; 6.74; 5.86; 2.97; 22.74 G/274.31G; G/274.31G; G/274.31G; G/274.31G; G/274.31G
4000 POWDER, FOR SOLUTION ORAL
COMMUNITY
Start: 2024-11-18

## 2025-06-17 RX ORDER — CETIRIZINE HYDROCHLORIDE 10 MG/1
10 TABLET ORAL DAILY
Qty: 30 TABLET | Refills: 2 | Status: SHIPPED | OUTPATIENT
Start: 2025-06-17 | End: 2025-09-15

## 2025-06-17 RX ORDER — TRIAMCINOLONE ACETONIDE 1 MG/G
OINTMENT TOPICAL 2 TIMES DAILY
Qty: 453.6 G | Refills: 0 | Status: SHIPPED | OUTPATIENT
Start: 2025-06-17 | End: 2025-09-15

## 2025-06-17 ASSESSMENT — PAIN SCALES - GENERAL: PAINLEVEL_OUTOF10: 9

## 2025-06-17 NOTE — PROGRESS NOTES
Subjective   Patient ID: Cathy Curiel is a 69 y.o. female who presents for Follow-up (Pt is having breathing problems.).    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 Attributes symptoms to fan use. Also reports rhinorrhea, headache, sinus pressure, irritated throat, and wheezing. Denies sick contacts. Had a fever up to 103°F last night, afebrile in clinic today (97°F). Using nebulizer treatments 2-3 times daily. Takes Breztri twice daily. Prior PFT (Feb 2024) showed moderate obstructive defect without bronchodilator response. Oxygen saturation 96% on room air. Physical exam notable for poor expiratory flow without adventitious lung sounds or peripheral edema.    Review of Systems  As per HPI   Previous history  Medical History[1]  Surgical History[2]  Social History[3]  Family History[4]  Allergies[5]  Current Outpatient Medications   Medication Instructions    acetaminophen (Tylenol) 500 mg tablet 2 tablets, As needed    albuterol 90 mcg/actuation inhaler 1-2 puffs, inhalation, Every 6 hours PRN, Every 4 to 6 hours as needed    albuterol 2.5 mg, nebulization, 4 times daily PRN    alcohol swabs (Alcohol Prep Pads) pads, medicated 1 Pad, topical (top), Daily    blood sugar diagnostic (OneTouch Ultra Test) 1 each, Topical, Daily    budesonide-glycopyr-formoterol (Breztri Aerosphere) 160-9-4.8 mcg/actuation HFA aerosol inhaler 2 puffs, inhalation, 2 times daily    cetirizine (ZYRTEC) 10 mg, oral, Daily    doxycycline (VIBRAMYCIN) 100 mg, oral, 2 times daily, Take with at least 8 ounces (large glass) of water, do not lie down for 30 minutes after    fluticasone (Flonase) 50 mcg/actuation nasal spray 1 spray, Each Nostril, Daily, Shake gently. Before first use,  prime pump. After use, clean tip and replace cap.    FreeStyle glucose monitoring (OneTouch Ultra2 Meter) kit 1 each, miscellaneous, Daily    GaviLyte-G 236-22.74-6.74 -5.86 gram solution 4,000 mL, Daily (0630)    ibuprofen 800 mg, oral, 3 times daily PRN    lancets (Onetouch Delica Safety Lancet) 30 gauge misc 1 Lancet, miscellaneous, Daily    losartan-hydrochlorothiazide (Hyzaar) 100-25 mg tablet 1 tablet, oral, Daily    metFORMIN XR (GLUCOPHAGE-XR) 500 mg, oral, Daily with breakfast, Do not crush, chew, or split.    metoprolol tartrate (LOPRESSOR) 100 mg, oral, Once PRN Procedure    mv,rex,min/iron/folic acid/lut (COMPLETE MULTI ORAL) 1 capsule, Daily    rosuvastatin (CRESTOR) 40 mg, oral, Every Day    sodium chloride (Saline Nose) 0.65 % nasal spray 1 spray, Each Nostril, As needed    tacrolimus (Protopic) 0.1 % ointment Topical, 2 times daily, To rash until clear    triamcinolone (Kenalog) 0.1 % ointment Topical, 2 times daily, Apply gently to the affected area three times daily to four times daily       Objective     Vitals:    06/17/25 1116   BP: 145/84   Pulse: 93   Temp: 36.1 °C (97 °F)   SpO2: 96%     Physical Exam      Assessment/Plan   Cathy Curiel is a 69 y.o. 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 Seasonal symptoms likely contributing to upper airway inflammation and respiratory symptoms. Tobacco use likely contributing to chronic respiratory issues.    Plan:    - Consider prednisone taper (if not improving in 1-2 weeks)   - Encourage continued use of nebulizer and Breztri    - Monitor symptoms, call if worsening    - Start  cetirizine (Zyrtec) daily    - Avoid triggers and consider nasal saline    - Strongly encouraged smoking cessation      # Acute Sinusitis    :: Symptoms of sinus congestion, headache, rhinorrhea, and fever consistent with sinusitis.    Plan:    - Start doxycycline 100 mg BID for 7 days (PCN allergy)    - Supportive care    Problem List Items Addressed This Visit       Hypertension - Primary    Allergic rhinitis    Relevant Medications    cetirizine (ZyrTEC) 10 mg tablet    Erythematous eczema    Relevant Medications    triamcinolone (Kenalog) 0.1 % ointment    Acute sinusitis    Relevant Medications    doxycycline (Vibramycin) 100 mg capsule     Other Visit Diagnoses         Cramp and spasm        Relevant Orders    Basic metabolic panel          Return in : 3 months or sooner as needed    Discussed with co-signer of note Dr. To      Portions of this note were generated using digital voice recognition software, and may contain grammatical errors       Nicola Mojica, DO  Family Medicine PGY-3       [1]   Past Medical History:  Diagnosis Date    Asthma     COPD (chronic obstructive pulmonary disease) (Multi)     Diabetes mellitus (Multi)     Other intervertebral disc displacement, lumbar region     Lumbar disc herniation    Spindle cell sarcoma (Multi)     Left distal arm   [2]   Past Surgical History:  Procedure Laterality Date    CT GUIDED PERCUTANEOUS BIOPSY BONE DEEP  08/29/2022    CT GUIDED PERCUTANEOUS BIOPSY BONE DEEP 8/29/2022 [facility]    HYSTERECTOMY      OTHER SURGICAL HISTORY  04/04/2022    Shoulder arthroscopy   [3]   Social History  Tobacco Use    Smoking status: Every Day     Current packs/day: 1.00     Average packs/day: 1 pack/day for 40.8 years (40.8 ttl pk-yrs)     Types: Cigarettes     Start date: 9/6/1984    Smokeless tobacco: Never   Vaping Use    Vaping status: Never Used   Substance Use Topics    Alcohol use: Not Currently    Drug use: Never   [4]   Family History  Problem Relation  Name Age of Onset    Uterine cancer Mother      Hypertension Mother          BENIGN    Other (CVA) Father      Hypertension Father      Throat cancer Brother      Breast cancer Other     [5]   Allergies  Allergen Reactions    Azithromycin Hives, Shortness of breath and Swelling     feet swell and blister    Meloxicam Hives and Unknown    Benzonatate Hives, Itching and Rash    Etodolac Other     HA    Iodinated Contrast Media Rash    Latex Rash    Penicillin Swelling    Penicillins Unknown

## 2025-06-18 PROBLEM — D50.9 IRON DEFICIENCY ANEMIA: Status: RESOLVED | Noted: 2017-05-10 | Resolved: 2025-06-18

## 2025-06-19 ENCOUNTER — TELEPHONE (OUTPATIENT)
Facility: HOSPITAL | Age: 70
End: 2025-06-19
Payer: COMMERCIAL

## 2025-06-19 DIAGNOSIS — C49.9 SPINDLE CELL SARCOMA (MULTI): ICD-10-CM

## 2025-06-19 DIAGNOSIS — J44.1 COPD EXACERBATION (MULTI): Primary | ICD-10-CM

## 2025-06-19 RX ORDER — PREDNISONE 20 MG/1
40 TABLET ORAL DAILY
Qty: 10 TABLET | Refills: 0 | Status: SHIPPED | OUTPATIENT
Start: 2025-06-19 | End: 2025-06-24

## 2025-06-19 NOTE — TELEPHONE ENCOUNTER
Telephone Note    Patient called reporting a persistent cough. Based on history of COPD and symptoms, concern for COPD exacerbation is high. Patient agreed to initiate previously discussed backup plan of steroid therapy. Plan is to start a prednisone 40 mg daily for 5 days.    During the conversation, patient was also reminded of the need for follow-up imaging related to her history of spindle cell sarcoma of the left humerus. Orthopedic recommendations for MRI of the left humerus and CT chest were not yet completed. Patient acknowledged she had not followed through and agreed to proceed with referrals. She expressed willingness to work with orthopedics and was open to options for sedation if needed for imaging (e.g., Ativan).    Referrals to orthopedics and imaging placed as patient had been lost to follow-up. Will monitor for clinical improvement with prednisone and follow imaging results once completed.    Nicola Mojica, DO  Family Medicine PGY-3

## 2025-06-23 ENCOUNTER — TELEPHONE (OUTPATIENT)
Dept: PRIMARY CARE | Facility: CLINIC | Age: 70
End: 2025-06-23
Payer: COMMERCIAL

## 2025-06-23 NOTE — TELEPHONE ENCOUNTER
Patient called to state that  (predniSONE (Deltasone) 20 mg tablet) that was prescribed to her isn't working and would like for you to please call her.

## 2025-06-25 DIAGNOSIS — J44.1 COPD EXACERBATION (MULTI): Primary | ICD-10-CM

## 2025-06-25 NOTE — PROGRESS NOTES
Telephone Note    Patient called reporting ongoing productive cough with brownish-yellow mucus and nasal congestion. Symptoms persist despite prior treatment with daily cetirizine, a 5-day course of prednisone 40 mg, and 7 days of doxycycline for presumed sinusitis and COPD exacerbation. She reports minimal to no improvement with this regimen. Now experiencing right-sided ear discomfort and “so-so” breathing, which she states has worsened since onset of illness. She also endorses chills, intermittent leg swelling, and chest discomfort, which she attributes to prolonged coughing. Unsure if febrile. Past echocardiogram from 6/2022 showed preserved EF (65%), impaired diastolic relaxation, and normal PASP (28 mmHg).    # Cough    :: Persistent cough with mucopurulent sputum, sinus symptoms, ear pain, and dyspnea with minimal response to prior treatment for allergic rhinitis, sinusitis, and COPD exacerbation. Worsening respiratory status concerning for possible community-acquired pneumonia or alternative pulmonary pathology    PLAN    - Order chest X-ray to evaluate for pneumonia    - If CXR unremarkable, consider in-person evaluation at [facility] on Friday 6/27    - Consider referral to pulmonology vs ENT based on exam and imaging findings    - ED precautions: advised to seek emergency care for worsening dyspnea, persistent fever, new or worsening chest pain not clearly related to coughing, confusion, or inability to tolerate oral intake      Nicola Mojica, DO  Family Medicine PGY-3

## 2025-06-26 ENCOUNTER — HOSPITAL ENCOUNTER (OUTPATIENT)
Dept: RADIOLOGY | Facility: CLINIC | Age: 70
Discharge: HOME | End: 2025-06-26
Payer: COMMERCIAL

## 2025-06-26 DIAGNOSIS — J44.1 COPD EXACERBATION (MULTI): ICD-10-CM

## 2025-06-26 PROCEDURE — 71046 X-RAY EXAM CHEST 2 VIEWS: CPT

## 2025-06-26 PROCEDURE — 71046 X-RAY EXAM CHEST 2 VIEWS: CPT | Performed by: RADIOLOGY

## 2025-08-06 ENCOUNTER — APPOINTMENT (OUTPATIENT)
Dept: DERMATOLOGY | Facility: CLINIC | Age: 70
End: 2025-08-06
Payer: COMMERCIAL

## 2025-08-14 ENCOUNTER — OFFICE VISIT (OUTPATIENT)
Dept: PULMONOLOGY | Facility: HOSPITAL | Age: 70
End: 2025-08-14
Payer: COMMERCIAL

## 2025-08-14 VITALS
DIASTOLIC BLOOD PRESSURE: 72 MMHG | WEIGHT: 189 LBS | HEART RATE: 69 BPM | TEMPERATURE: 97.2 F | OXYGEN SATURATION: 94 % | BODY MASS INDEX: 31.94 KG/M2 | SYSTOLIC BLOOD PRESSURE: 114 MMHG

## 2025-08-14 DIAGNOSIS — J44.1 COPD EXACERBATION (MULTI): ICD-10-CM

## 2025-08-14 DIAGNOSIS — J44.9 CHRONIC OBSTRUCTIVE PULMONARY DISEASE, UNSPECIFIED COPD TYPE (MULTI): ICD-10-CM

## 2025-08-14 PROCEDURE — 99214 OFFICE O/P EST MOD 30 MIN: CPT | Performed by: STUDENT IN AN ORGANIZED HEALTH CARE EDUCATION/TRAINING PROGRAM

## 2025-08-14 PROCEDURE — 3074F SYST BP LT 130 MM HG: CPT | Performed by: STUDENT IN AN ORGANIZED HEALTH CARE EDUCATION/TRAINING PROGRAM

## 2025-08-14 PROCEDURE — 1125F AMNT PAIN NOTED PAIN PRSNT: CPT | Performed by: STUDENT IN AN ORGANIZED HEALTH CARE EDUCATION/TRAINING PROGRAM

## 2025-08-14 PROCEDURE — 99213 OFFICE O/P EST LOW 20 MIN: CPT | Performed by: STUDENT IN AN ORGANIZED HEALTH CARE EDUCATION/TRAINING PROGRAM

## 2025-08-14 PROCEDURE — 1159F MED LIST DOCD IN RCRD: CPT | Performed by: STUDENT IN AN ORGANIZED HEALTH CARE EDUCATION/TRAINING PROGRAM

## 2025-08-14 PROCEDURE — 3078F DIAST BP <80 MM HG: CPT | Performed by: STUDENT IN AN ORGANIZED HEALTH CARE EDUCATION/TRAINING PROGRAM

## 2025-08-14 RX ORDER — NEBULIZER AND COMPRESSOR
1 EACH MISCELLANEOUS 4 TIMES DAILY PRN
Qty: 1 EACH | Refills: 0 | Status: SHIPPED | OUTPATIENT
Start: 2025-08-14 | End: 2025-09-13

## 2025-08-14 RX ORDER — ALBUTEROL SULFATE 90 UG/1
1-2 INHALANT RESPIRATORY (INHALATION) EVERY 6 HOURS PRN
Qty: 18 G | Refills: 3 | Status: SHIPPED | OUTPATIENT
Start: 2025-08-14 | End: 2026-08-14

## 2025-08-14 ASSESSMENT — LIFESTYLE VARIABLES
AUDIT-C TOTAL SCORE: 2
HOW OFTEN DO YOU HAVE A DRINK CONTAINING ALCOHOL: MONTHLY OR LESS
HOW MANY STANDARD DRINKS CONTAINING ALCOHOL DO YOU HAVE ON A TYPICAL DAY: PATIENT DOES NOT DRINK
SKIP TO QUESTIONS 9-10: 0
HOW OFTEN DO YOU HAVE SIX OR MORE DRINKS ON ONE OCCASION: LESS THAN MONTHLY

## 2025-08-14 ASSESSMENT — PAIN SCALES - GENERAL: PAINLEVEL_OUTOF10: 7

## 2025-08-28 ENCOUNTER — APPOINTMENT (OUTPATIENT)
Dept: GASTROENTEROLOGY | Facility: HOSPITAL | Age: 70
End: 2025-08-28
Payer: COMMERCIAL